# Patient Record
Sex: MALE | Race: WHITE | Employment: OTHER | URBAN - METROPOLITAN AREA
[De-identification: names, ages, dates, MRNs, and addresses within clinical notes are randomized per-mention and may not be internally consistent; named-entity substitution may affect disease eponyms.]

---

## 2017-03-13 ENCOUNTER — HOSPITAL ENCOUNTER (OUTPATIENT)
Facility: CLINIC | Age: 77
Setting detail: SPECIMEN
Discharge: HOME | End: 2017-03-13
Attending: INTERNAL MEDICINE | Admitting: INTERNAL MEDICINE
Payer: MEDICARE

## 2017-03-13 ENCOUNTER — ONCOLOGY VISIT (OUTPATIENT)
Dept: ONCOLOGY | Facility: CLINIC | Age: 77
End: 2017-03-13
Attending: INTERNAL MEDICINE
Payer: MEDICARE

## 2017-03-13 ENCOUNTER — HOSPITAL ENCOUNTER (OUTPATIENT)
Dept: CT IMAGING | Facility: CLINIC | Age: 77
Discharge: HOME | End: 2017-03-13
Attending: INTERNAL MEDICINE | Admitting: INTERNAL MEDICINE
Payer: MEDICARE

## 2017-03-13 DIAGNOSIS — C67.8 MALIGNANT NEOPLASM OF OVERLAPPING SITES OF BLADDER (H): ICD-10-CM

## 2017-03-13 LAB
ALBUMIN SERPL-MCNC: 3.5 G/DL (ref 3.4–5)
ALP SERPL-CCNC: 57 U/L (ref 40–150)
ALT SERPL W P-5'-P-CCNC: 41 U/L (ref 0–70)
ANION GAP SERPL CALCULATED.3IONS-SCNC: 9 MMOL/L (ref 3–14)
AST SERPL W P-5'-P-CCNC: 27 U/L (ref 0–45)
BASOPHILS # BLD AUTO: 0 10E9/L (ref 0–0.2)
BASOPHILS NFR BLD AUTO: 0.5 %
BILIRUB SERPL-MCNC: 0.9 MG/DL (ref 0.2–1.3)
BUN SERPL-MCNC: 38 MG/DL (ref 7–30)
CALCIUM SERPL-MCNC: 9.5 MG/DL (ref 8.5–10.1)
CHLORIDE SERPL-SCNC: 108 MMOL/L (ref 94–109)
CO2 SERPL-SCNC: 21 MMOL/L (ref 20–32)
CREAT SERPL-MCNC: 1.76 MG/DL (ref 0.66–1.25)
DIFFERENTIAL METHOD BLD: NORMAL
EOSINOPHIL # BLD AUTO: 0.1 10E9/L (ref 0–0.7)
EOSINOPHIL NFR BLD AUTO: 1.2 %
ERYTHROCYTE [DISTWIDTH] IN BLOOD BY AUTOMATED COUNT: 13.2 % (ref 10–15)
GFR SERPL CREATININE-BSD FRML MDRD: 38 ML/MIN/1.7M2
GLUCOSE SERPL-MCNC: 252 MG/DL (ref 70–99)
HCT VFR BLD AUTO: 42.9 % (ref 40–53)
HGB BLD-MCNC: 14.4 G/DL (ref 13.3–17.7)
IMM GRANULOCYTES # BLD: 0.1 10E9/L (ref 0–0.4)
IMM GRANULOCYTES NFR BLD: 0.8 %
LDH SERPL L TO P-CCNC: 188 U/L (ref 85–227)
LYMPHOCYTES # BLD AUTO: 1.2 10E9/L (ref 0.8–5.3)
LYMPHOCYTES NFR BLD AUTO: 17 %
MCH RBC QN AUTO: 31.8 PG (ref 26.5–33)
MCHC RBC AUTO-ENTMCNC: 33.6 G/DL (ref 31.5–36.5)
MCV RBC AUTO: 95 FL (ref 78–100)
MONOCYTES # BLD AUTO: 0.6 10E9/L (ref 0–1.3)
MONOCYTES NFR BLD AUTO: 8.5 %
NEUTROPHILS # BLD AUTO: 5.3 10E9/L (ref 1.6–8.3)
NEUTROPHILS NFR BLD AUTO: 72 %
NRBC # BLD AUTO: 0 10*3/UL
NRBC BLD AUTO-RTO: 0 /100
PLATELET # BLD AUTO: 162 10E9/L (ref 150–450)
POTASSIUM SERPL-SCNC: 4.6 MMOL/L (ref 3.4–5.3)
PROT SERPL-MCNC: 7 G/DL (ref 6.8–8.8)
RBC # BLD AUTO: 4.53 10E12/L (ref 4.4–5.9)
SODIUM SERPL-SCNC: 138 MMOL/L (ref 133–144)
WBC # BLD AUTO: 7.3 10E9/L (ref 4–11)

## 2017-03-13 PROCEDURE — 74176 CT ABD & PELVIS W/O CONTRAST: CPT

## 2017-03-13 PROCEDURE — 36415 COLL VENOUS BLD VENIPUNCTURE: CPT

## 2017-03-13 NOTE — NURSING NOTE
Medical Assistant Note:  Baldo Landon presents today for lab only.    Patient seen by provider today: No.   present during visit today: Not Applicable.    Concerns: No Concerns.    Procedure:  Not Applicable.      Post Assessment:  Labs drawn without difficulty: Yes.    Discharge Plan:  Departure Mode: Ambulatory.    Steph Green

## 2017-03-13 NOTE — MR AVS SNAPSHOT
After Visit Summary   3/13/2017    Baldo Landon    MRN: [id number]           Patient Information     Date Of Birth          1940        Visit Information        Provider Department      3/13/2017 11:00 AM RH ONCOLOGY NURSE [facility]        Today's Diagnoses     Malignant neoplasm of overlapping sites of bladder (H)           Follow-ups after your visit        Your next 10 appointments already scheduled     Mar 13, 2017 11:00 AM CDT   Return Visit with  ONCOLOGY NURSE   [facility] ([facility])    [facility]  [address]  [facility] [address]   [phone]            Mar 13, 2017 11:30 AM CDT   CT CHEST ABDOMEN PELVIS W/O CONTRAST with [address] ([facility])    [address]  [facility] [address]   [phone]           Please bring any scans or X-rays taken at other hospitals, if similar tests were done. Also bring a list of your medicines, including vitamins, minerals and over-the-counter drugs. It is safest to leave personal items at home.  Be sure to tell your doctor:   If you have any allergies.   If there s any chance you are pregnant.   If you are breastfeeding.   If you have any special needs.  You do not need to do anything special to prepare.  Please wear loose clothing, such as a sweat suit or jogging clothes. Avoid snaps, zippers and other metal. We may ask you to undress and put on a hospital gown.            Mar 21, 2017  1:15 PM CDT   Return Visit with Bradford Pires MD   [facility] ([facility])    [facility]  [address]  [facility] [address]   [phone]              Who to contact     If you have questions or need follow up information about today's clinic visit or your schedule please contact [facility] directly at [phone].  Normal or non-critical lab and imaging results will be communicated to you by MyChart, letter or phone within 4 business days after the clinic has received the results. If you do not hear from us within 7 days, please contact the clinic through [employer] or phone. If you have a critical or abnormal lab result, we will notify you by phone as soon as possible.  Submit refill requests through [employer] or call your pharmacy and they will forward the refill request to us. Please allow 3 business days for your refill to be completed.          Additional Information About Your Visit        [employer] Information     [employer] gives you secure access to your electronic health record. If you see a primary care provider, you can also send messages to your care team and make appointments. If you have questions, please call your primary care clinic.  If you do not have a primary care provider, please call [phone] and they will assist you.        Care EveryWhere ID     This is your Care EveryWhere ID. This could be used by other organizations to access your [place] medical records  [id number]         Blood Pressure from Last 3 Encounters:   09/16/16 105/68   09/16/16 105/68   06/17/16 112/75    Weight from Last 3 Encounters:   09/16/16 79.8 kg (176 lb)   09/16/16 80.2 kg (176 lb 12.8 oz)   06/17/16 77.1 kg (170 lb)              We Performed the Following     CBC with platelets differential     Comprehensive metabolic panel     Lactate Dehydrogenase        Primary Care Provider Office Phone # Fax #    Luis Monge [phone]       [address] [address]        Thank you!     Thank you for choosing [facility]  for your care. Our goal is always to provide you with excellent care. Hearing back from our patients is one way we can continue to improve our services. Please take a few minutes to complete the  written survey that you may receive in the mail after your visit with us. Thank you!             Your Updated Medication List - Protect others around you: Learn how to safely use, store and throw away your medicines at www.disposemymeds.org.          This list is accurate as of: 3/13/17 10:49 AM.  Always use your most recent med list.                   Brand Name Dispense Instructions for use    DAILY MULTIVITAMIN PO      Take 1 tablet by mouth daily       DIGOXIN PO      Take 125 mcg by mouth daily       GENTLE STOOL SOFTENER PO      Take 100 mg by mouth daily       GLIPIZIDE PO      Take 2.5 mg by mouth every morning (before breakfast)       LISINOPRIL PO      Take 5 mg by mouth       metoprolol 100 MG tablet    LOPRESSOR    60 tablet    Take 1 tablet (100 mg) by mouth 2 times daily       order for DME     1 Month    Equipment being ordered: Urostomy supplies       SIMVASTATIN PO      Take 40 mg by mouth At Bedtime       warfarin 5 MG tablet    COUMADIN    30 tablet    Take 1 tablet (5 mg) by mouth daily Except 1/2 pill on Monday

## 2017-03-20 ENCOUNTER — CARE COORDINATION (OUTPATIENT)
Dept: ONCOLOGY | Facility: CLINIC | Age: 77
End: 2017-03-20

## 2017-03-20 NOTE — PROGRESS NOTES
Per Dr. Betancourt, no need for Urogram since patient had CT of C/A/P on 3/13.  Stefan Vásquez, RN, BSN, OCN  [facility]  Patient Care Coordinator

## 2017-03-21 ENCOUNTER — ONCOLOGY VISIT (OUTPATIENT)
Dept: ONCOLOGY | Facility: CLINIC | Age: 77
End: 2017-03-21
Attending: INTERNAL MEDICINE
Payer: COMMERCIAL

## 2017-03-21 VITALS
RESPIRATION RATE: 16 BRPM | SYSTOLIC BLOOD PRESSURE: 121 MMHG | OXYGEN SATURATION: 98 % | HEART RATE: 84 BPM | HEIGHT: 70 IN | BODY MASS INDEX: 24.91 KG/M2 | DIASTOLIC BLOOD PRESSURE: 76 MMHG | TEMPERATURE: 98 F | WEIGHT: 174 LBS

## 2017-03-21 DIAGNOSIS — C67.8 MALIGNANT NEOPLASM OF OVERLAPPING SITES OF BLADDER (H): Primary | ICD-10-CM

## 2017-03-21 PROCEDURE — 99213 OFFICE O/P EST LOW 20 MIN: CPT | Performed by: INTERNAL MEDICINE

## 2017-03-21 PROCEDURE — 99211 OFF/OP EST MAY X REQ PHY/QHP: CPT

## 2017-03-21 ASSESSMENT — PAIN SCALES - GENERAL: PAINLEVEL: NO PAIN (0)

## 2017-03-21 NOTE — PROGRESS NOTES
"[facility]  HEMATOLOGY ONCOLOGY     DIAGNOSES:     1.  Muscle invasive bladder cancer diagnosed 05/11/2015.    2.  History of prostate cancer.      TREATMENT:     1.  TURBT, 05/11/2015.    2.  Cycle 1 neoadjuvant dose dense MVAC, methotrexate, vinblastine, doxorubicin and cyclophosphamide 06/11/2015 with Neulasta support.  Cycle 2 06/24/2015. Cycle 3 7/8/15, Cycle 4 7/22/15.  3.  Cystoprostatectomy - 9/15/15  4. Atezolizumab for recurrent urothelial cancer     SUBJECTIVE:  Baldo Landon comes in for a followup today. He is feeling well.    He has recovered well from his neoadjuvant chemotherapy / surgery and has no new complains.  He had a scan done just prior to clinic visit and he is here to review findings.     REVIEW OF SYSTEMS:  A complete review of systems was performed and found to be negative other than pertinent positives mentioned in history of present illness.     Past medical, social histories reviewed.    Meds- Reviewed.     PHYSICAL EXAMINATION:   VITAL SIGNS:/76  Pulse 84  Temp 98  F (36.7  C) (Tympanic)  Resp 16  Ht 1.778 m (5' 10\")  Wt 78.9 kg (174 lb)  SpO2 98%  BMI 24.97 kg/m2  GENERAL: Sitting comfortably.   HEENT: Pupils are equal. Oropharynx has small area of mucositis.  NECK: No cervical or supraclavicular lymphadenopathy.   LUNGS: Clear bilaterally.   HEART: S1, S2, regular.   ABDOMEN: Soft, nontender, nondistended, no hepatosplenomegaly.   EXTREMITIES: Warm, well perfused.   NEUROLOGIC: Alert, awake.   SKIN: No rash.   LYMPHATICS: No edema.     DATA:   Recent Labs   Lab Test  03/13/17   1026  09/16/16   1045  06/07/16   0753  03/18/16   1205  11/10/15   0955   NA  138  135  138  139  139   POTASSIUM  4.6  5.0  5.0  4.9  5.1   CHLORIDE  108  105  108  111*  110*   CO2  21  24  22  21  22   ANIONGAP  9  6  8  7  7   BUN  38*  33*  34*  34*  39*   CR  1.76*  1.73*  1.61*  1.59*  1.43*   GLC  252*  250*  167*  164*  185*   CARSON  9.5  8.8  8.7  9.0  9.3     Recent " Labs   Lab Test  09/28/15   0829  09/27/15   1032  09/26/15   0556  09/25/15   0705  09/24/15   0705  09/23/15   0645  09/22/15   0630   MAG  1.8  2.0  1.7  1.9  1.8  1.7   --    PHOS  3.3   --    --   2.8  2.8  2.6  4.0     Recent Labs   Lab Test  03/13/17   1026  09/16/16   1045  06/07/16   0753  03/18/16   1205  11/10/15   0955   WBC  7.3  6.2  6.5  5.4  7.1   HGB  14.4  12.5*  12.7*  11.5*  11.2*   PLT  162  135*  131*  131*  180   MCV  95  95  95  96  91   NEUTROPHIL  72.0  68.9  72.7  72.8  71.6     Recent Labs   Lab Test  03/13/17   1026  09/16/16   1045  06/07/16   0753   BILITOTAL  0.9  0.6  0.7   ALKPHOS  57  55  62   ALT  41  27  32   AST  27  19  23   ALBUMIN  3.5  3.3*  3.4   LDH  188  172  180     Results for orders placed or performed during the hospital encounter of 03/13/17   CT Chest Abdomen Pelvis w/o Contrast    Narrative    CT CHEST, ABDOMEN AND PELVIS WITHOUT CONTRAST 3/13/2017 11:47 AM     HISTORY: Malignant neoplasm of overlapping sites of bladder.     COMPARISON: CT chest, abdomen and pelvis 9/16/2016.    TECHNIQUE: Axial images from the thoracic inlet to the symphysis are  performed with additional coronal reformatted images. No IV contrast  is utilized. Oral contrast is administered. Radiation dose for this  scan was reduced using automated exposure control, adjustment of the  mA and/or kV according to patient size, or iterative reconstruction  technique.    FINDINGS:     Chest: Calcified granuloma is noted near the medial aspect right major  fissure in the right middle lobe on series 3, image 40.  Additional  calcified granulomas are noted in the posterolateral left lower lobe  on image 38 and in the posterior left lower lobe on image 35. Lungs  are otherwise clear. Area of nodularity along the anteromedial right  upper lobe on image 29 is stable and likely subpleural scarring. No  pleural or pericardial fluid. The heart is normal in size. Esophagus  is unremarkable. Thyroid gland appears  normal in size. No enlarged  lymph nodes. Aorta is calcified without evidence of aneurysm.    Abdomen: There is fatty infiltration of the liver. No calcified  gallstones. Pneumobilia is noted likely due to prior sphincterotomy.  Air is also noted in the gallbladder. Peripherally calcified lesion is  noted in the region of the lowell hepatis measuring 2.3 x 1.7 cm,  previously 2.4 x 1.6 cm. 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 Large cyst lower pole right kidney is unchanged.  Nonobstructing stones left collecting system are evident. Patient is  status post urine ostomy. No hydronephrosis. No right renal calculi.  Abdominal aorta is calcified along with branch vessels, but there is  no evidence of aneurysm. The spleen, pancreas and adrenal glands are  within normal limits. No enlarged lymph nodes. The bowel is  unremarkable. No obstruction or diverticulitis.    Pelvis: Prior cystoprostatectomy. No enlarged pelvic lymph nodes or  free fluid. Rectum is unremarkable with mild circumferential wall  thickening probably due to incomplete distention. 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 Prominence of the external iliac arteries and  proximal left internal iliac artery are also noted. Bone window  examination demonstrates mild degenerative SI joint and spine changes.      Impression    IMPRESSION:  1. Evidence of old granulomatous disease. Lungs are otherwise clear.  No enlarged lymph nodes.  2. Fatty infiltration of the liver and pneumobilia appear unchanged.  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 No interval change.  3. Tiny nonobstructing left renal collecting system stones. No  hydronephrosis. Large simple-appearing cyst lower pole right kidney is  unchanged.  4. Postop changes from cystoprostatectomy and ileal loop diversion.  5.  Extensive calcified atherosclerotic plaque in the aorta without  evidence of aortic aneurysm. Dilatation of the common iliac arteries,  left external iliac artery and proximal left internal iliac artery are  consistent with aneurysms. No significant interval change.    JACQUELYN SHARMA MD         ASSESSMENT:    1. Muscle invasive bladder cancer - post dose dense MVAC ypT1N0 disease  2. CKD stage III; with some decline in function  3. Prostate cancer s/p xrt, TIA - 2010, A fib on warfarin, DM II    I have reviewed actual images from his recent staging CT scan. All his previous lung lesions are stable. Nothing to suggest recurrent disease based on his CT scans, labs or symptoms    His kidney function remains worse off today. He does have HTN and DM TII in addition to his CKD.     I explained that we could follow him every 4-6 months. I have suggested that he get his labs, scans and urine exam - week to 10 days prior to clinic visit if feasible. Urine cytology is negative and FISH suggests inadequate due to cell counts.

## 2017-03-21 NOTE — NURSING NOTE
"Baldo Landon is a 76 year old male who presents for:  Chief Complaint   Patient presents with     Oncology Clinic Visit     Bladder cancer - follow up        Initial Vitals:  /76  Pulse 84  Temp 98  F (36.7  C) (Tympanic)  Resp 16  Ht 1.778 m (5' 10\")  Wt 78.9 kg (174 lb)  SpO2 98%  BMI 24.97 kg/m2 Estimated body mass index is 24.97 kg/(m^2) as calculated from the following:    Height as of this encounter: 1.778 m (5' 10\").    Weight as of this encounter: 78.9 kg (174 lb).. Body surface area is 1.97 meters squared. BP completed using cuff size: regular  No Pain (0) No LMP for male patient. Allergies and medications reviewed.     Medications: Medication refills not needed today.  Pharmacy name entered into [employer]:    [place], [place] - [address] [facility] [address] [place], [place] - [address] [id number] - [place], [place] - [address]    Comments: Follow up    8 minutes for nursing intake (face to face time)   Steph Green CMA     DISCHARGE PLAN:  Next appointments: See patient instruction section  Departure Mode: Ambulatory  Accompanied by: wife  0 minutes for nursing discharge (face to face time)   Steph Green CMA            "

## 2017-03-21 NOTE — MR AVS SNAPSHOT
After Visit Summary   3/21/2017    Baldo Landon    MRN: [id number]           Patient Information     Date Of Birth          1940        Visit Information        Provider Department      3/21/2017 1:15 PM Bradford Pires MD [facility]        Today's Diagnoses     Malignant neoplasm of overlapping sites of bladder (H)    -  1      Care Instructions    1.  Proceed with chemotherapy N/A    2.  Therapy/Supportive Treatment NO       3.  Does the patient require further chemotherapy N/A    4.  Appointments:  Follow up with me in 6 months    5.  Labs:  CBC w Diff, CMP and LDH; urine for cytology and FISH a week to 10 days prior to visit     6.  Imaging:  CT Scan Chest/abd/pelvis 10 days prior to visit    7.  Other No          Follow-ups after your visit        Your next 10 appointments already scheduled     Mar 21, 2017  1:15 PM CDT   Return Visit with Bradford Pires MD   [facility] ([facility])    [facility]  [address]  [facility] [address]   [phone]            Mar 22, 2017  2:00 PM CDT   Return Visit with Ruddy Betancourt MD   [facility] ([facility])    [facility]  [address]  [facility] [address]   [phone]            Sep 12, 2017 11:00 AM CDT   Return Visit with  ONCOLOGY NURSE   [facility] ([facility])    [facility]  [address]  [facility] [address]   [phone]            Sep 12, 2017 11:30 AM CDT   CT CHEST ABDOMEN PELVIS W/O CONTRAST with [id number] ([facility])    [address]  [facility] [address]   [phone]           Please bring any scans or X-rays taken at other hospitals, if similar tests were done. Also bring a list of  your medicines, including vitamins, minerals and over-the-counter drugs. It is safest to leave personal items at home.  Be sure to tell your doctor:   If you have any allergies.   If there s any chance you are pregnant.   If you are breastfeeding.   If you have any special needs.  You do not need to do anything special to prepare.  Please wear loose clothing, such as a sweat suit or jogging clothes. Avoid snaps, zippers and other metal. We may ask you to undress and put on a hospital gown.            Sep 19, 2017 12:15 PM CDT   Return Visit with Bradford Pires MD   [facility] ([facility])    [facility]  [address]  [facility] [address] [phone]              Future tests that were ordered for you today     Open Standing Orders        Priority Remaining Interval Expires Ordered    CBC with platelets differential Routine 12/12  3/21/2018 3/21/2017    Comprehensive metabolic panel Routine 12/12  3/21/2018 3/21/2017    Lactate Dehydrogenase Routine 12/12  3/21/2018 3/21/2017    Cytology non gyn Routine 12/12  3/21/2018 3/21/2017          Open Future Orders        Priority Expected Expires Ordered    CT Chest Abdomen Pelvis w/o Contrast Routine 9/5/2017 3/21/2018 3/21/2017            Who to contact     If you have questions or need follow up information about today's clinic visit or your schedule please contact [facility] directly at [phone].  Normal or non-critical lab and imaging results will be communicated to you by MyChart, letter or phone within 4 business days after the clinic has received the results. If you do not hear from us within 7 days, please contact the clinic through [employer] or phone. If you have a critical or abnormal lab result, we will notify you by phone as soon as possible.  Submit refill requests through [employer] or call your pharmacy and they will forward the refill request to us.  "Please allow 3 business days for your refill to be completed.          Additional Information About Your Visit        [employer] Information     [employer] gives you secure access to your electronic health record. If you see a primary care provider, you can also send messages to your care team and make appointments. If you have questions, please call your primary care clinic.  If you do not have a primary care provider, please call [phone] and they will assist you.        Care EveryWhere ID     This is your Care EveryWhere ID. This could be used by other organizations to access your [place] medical records  [id number]        Your Vitals Were     Pulse Temperature Respirations Height Pulse Oximetry BMI (Body Mass Index)    84 98  F (36.7  C) (Tympanic) 16 1.778 m (5' 10\") 98% 24.97 kg/m2       Blood Pressure from Last 3 Encounters:   03/21/17 121/76   09/16/16 105/68   09/16/16 105/68    Weight from Last 3 Encounters:   03/21/17 78.9 kg (174 lb)   09/16/16 79.8 kg (176 lb)   09/16/16 80.2 kg (176 lb 12.8 oz)                 Today's Medication Changes          These changes are accurate as of: 3/21/17  1:07 PM.  If you have any questions, ask your nurse or doctor.               Stop taking these medicines if you haven't already. Please contact your care team if you have questions.     SIMVASTATIN PO                    Primary Care Provider Office Phone # Fax #    Luis Monge [phone]       [address] [address]        Thank you!     Thank you for choosing [facility]  for your care. Our goal is always to provide you with excellent care. Hearing back from our patients is one way we can continue to improve our services. Please take a few minutes to complete the written survey that you may receive in the mail after your visit with us. Thank you!             Your Updated Medication List - Protect others around you: Learn how to safely " use, store and throw away your medicines at www.disposemymeds.org.          This list is accurate as of: 3/21/17  1:07 PM.  Always use your most recent med list.                   Brand Name Dispense Instructions for use    DAILY MULTIVITAMIN PO      Take 1 tablet by mouth daily       DIGOXIN PO      Take 125 mcg by mouth daily 1/2 tablet once daily       GENTLE STOOL SOFTENER PO      Take 100 mg by mouth daily       GLIPIZIDE PO      Take 10 mg by mouth 2 times daily (before meals)       LIPITOR PO      Take 80 mg by mouth daily 1/2 tablet daily       LISINOPRIL PO      Take 5 mg by mouth       metoprolol 100 MG tablet    LOPRESSOR    60 tablet    Take 1 tablet (100 mg) by mouth 2 times daily       order for DME     1 Month    Equipment being ordered: Urostomy supplies       warfarin 5 MG tablet    COUMADIN    30 tablet    Take 1 tablet (5 mg) by mouth daily Except 1/2 pill on Monday

## 2017-03-21 NOTE — PATIENT INSTRUCTIONS
1.  Proceed with chemotherapy N/A    2.  Therapy/Supportive Treatment NO       3.  Does the patient require further chemotherapy N/A    4.  Appointments:  Follow up with me in 6 months- Scheduled for Sept 19th @ 12:15/Elodia    5.  Labs:  CBC w Diff, CMP and LDH; urine for cytology and FISH a week to 10 days prior to visit - Scheduled for Sept 12th @ 11:00    6.  Imaging:  CT Scan Chest/abd/pelvis 10 days prior to visit- Scheduled for Sept 12th @ 11:30/Elodia    7.  Other No     AVS printed and given.  Elodia

## 2017-03-22 ENCOUNTER — ONCOLOGY VISIT (OUTPATIENT)
Dept: ONCOLOGY | Facility: CLINIC | Age: 77
End: 2017-03-22
Attending: UROLOGY
Payer: COMMERCIAL

## 2017-03-22 VITALS
RESPIRATION RATE: 16 BRPM | HEIGHT: 70 IN | TEMPERATURE: 98 F | HEART RATE: 92 BPM | BODY MASS INDEX: 25.05 KG/M2 | DIASTOLIC BLOOD PRESSURE: 80 MMHG | WEIGHT: 175 LBS | OXYGEN SATURATION: 98 % | SYSTOLIC BLOOD PRESSURE: 128 MMHG

## 2017-03-22 DIAGNOSIS — C67.8 MALIGNANT NEOPLASM OF OVERLAPPING SITES OF BLADDER (H): Primary | ICD-10-CM

## 2017-03-22 PROCEDURE — 99213 OFFICE O/P EST LOW 20 MIN: CPT | Performed by: UROLOGY

## 2017-03-22 PROCEDURE — 99211 OFF/OP EST MAY X REQ PHY/QHP: CPT

## 2017-03-22 ASSESSMENT — PAIN SCALES - GENERAL: PAINLEVEL: NO PAIN (0)

## 2017-03-22 NOTE — MR AVS SNAPSHOT
After Visit Summary   3/22/2017    Baldo Landon    MRN: [id number]           Patient Information     Date Of Birth          1940        Visit Information        Provider Department      3/22/2017 2:00 PM Weight, Ruddy Casarez MD [facility] On[facility]      Today's Diagnoses     Malignant neoplasm of overlapping sites of bladder (H)    -  1      Care Instructions    We will follow up in [place] in 6 months with his visit with Dr. Pires:    Imaging per Dr. Pires    Labs per Dr. Pires    Doing well    Parastomal hernia        Follow-ups after your visit        Your next 10 appointments already scheduled     Sep 12, 2017 11:00 AM CDT   Return Visit with  ONCOLOGY NURSE   [facility] ([facility])    [facility]  [address]  [facility] [address] [phone]            Sep 12, 2017 11:30 AM CDT   CT CHEST ABDOMEN PELVIS W/O CONTRAST with [address] ([facility])    [address]  [facility] [address] [phone]           Please bring any scans or X-rays taken at other hospitals, if similar tests were done. Also bring a list of your medicines, including vitamins, minerals and over-the-counter drugs. It is safest to leave personal items at home.  Be sure to tell your doctor:   If you have any allergies.   If there s any chance you are pregnant.   If you are breastfeeding.   If you have any special needs.  You do not need to do anything special to prepare.  Please wear loose clothing, such as a sweat suit or jogging clothes. Avoid snaps, zippers and other metal. We may ask you to undress and put on a hospital gown.            Sep 19, 2017 12:15 PM CDT   Return Visit with Bradford Pires MD   [facility] ([facility])    [facility]  [address]  [facility] [address]   [phone]              Future tests that were ordered for you today     Open Standing Orders        Priority Remaining Interval Expires Ordered    CBC with platelets differential Routine 12/12  3/21/2018 3/21/2017    Comprehensive metabolic panel Routine 12/12  3/21/2018 3/21/2017    Lactate Dehydrogenase Routine 12/12  3/21/2018 3/21/2017    Cytology non gyn Routine 12/12  3/21/2018 3/21/2017          Open Future Orders        Priority Expected Expires Ordered    CT Chest Abdomen Pelvis w/o Contrast Routine 9/5/2017 3/21/2018 3/21/2017            Who to contact     If you have questions or need follow up information about today's clinic visit or your schedule please contact [facility] directly at [phone].  Normal or non-critical lab and imaging results will be communicated to you by Uplikehart, letter or phone within 4 business days after the clinic has received the results. If you do not hear from us within 7 days, please contact the clinic through Salezeot or phone. If you have a critical or abnormal lab result, we will notify you by phone as soon as possible.  Submit refill requests through Structure Vision or call your pharmacy and they will forward the refill request to us. Please allow 3 business days for your refill to be completed.          Additional Information About Your Visit        Structure Vision Information     Structure Vision gives you secure access to your electronic health record. If you see a primary care provider, you can also send messages to your care team and make appointments. If you have questions, please call your primary care clinic.  If you do not have a primary care provider, please call [phone] and they will assist you.        Care EveryWhere ID     This is your Care EveryWhere ID. This could be used by other organizations to access your [place] medical records  [id number]        Your Vitals Were     Pulse Temperature Respirations Height  "Pulse Oximetry BMI (Body Mass Index)    92 98  F (36.7  C) (Tympanic) 16 1.778 m (5' 10\") 98% 25.11 kg/m2       Blood Pressure from Last 3 Encounters:   03/22/17 128/80   03/21/17 121/76   09/16/16 105/68    Weight from Last 3 Encounters:   03/22/17 79.4 kg (175 lb)   03/21/17 78.9 kg (174 lb)   09/16/16 79.8 kg (176 lb)              Today, you had the following     No orders found for display       Primary Care Provider Office Phone # Fax #    Luis Monge [phone]       [address] [address]        Thank you!     Thank you for choosing [facility]  for your care. Our goal is always to provide you with excellent care. Hearing back from our patients is one way we can continue to improve our services. Please take a few minutes to complete the written survey that you may receive in the mail after your visit with us. Thank you!             Your Updated Medication List - Protect others around you: Learn how to safely use, store and throw away your medicines at www.disposemymeds.org.          This list is accurate as of: 3/22/17  2:47 PM.  Always use your most recent med list.                   Brand Name Dispense Instructions for use    DAILY MULTIVITAMIN PO      Take 1 tablet by mouth daily       DIGOXIN PO      Take 125 mcg by mouth daily 1/2 tablet once daily       GENTLE STOOL SOFTENER PO      Take 100 mg by mouth daily       GLIPIZIDE PO      Take 10 mg by mouth 2 times daily (before meals)       LIPITOR PO      Take 80 mg by mouth daily 1/2 tablet daily       LISINOPRIL PO      Take 5 mg by mouth       metoprolol 100 MG tablet    LOPRESSOR    60 tablet    Take 1 tablet (100 mg) by mouth 2 times daily       order for DME     1 Month    Equipment being ordered: Urostomy supplies       warfarin 5 MG tablet    COUMADIN    30 tablet    Take 5 mg by mouth daily Except 1/2 pill on Monday         "

## 2017-03-22 NOTE — NURSING NOTE
"Baldo Landon is a 76 year old male who presents for:  Chief Complaint   Patient presents with     Oncology Clinic Visit     Malignant neoplasm of overlapping sites of bladder -Follow up        Initial Vitals:  /80 (BP Location: Right arm, Patient Position: Chair, Cuff Size: Adult Regular)  Pulse 92  Temp 98  F (36.7  C) (Tympanic)  Resp 16  Ht 1.778 m (5' 10\")  Wt 79.4 kg (175 lb)  SpO2 98%  BMI 25.11 kg/m2 Estimated body mass index is 25.11 kg/(m^2) as calculated from the following:    Height as of this encounter: 1.778 m (5' 10\").    Weight as of this encounter: 79.4 kg (175 lb).. Body surface area is 1.98 meters squared. BP completed using cuff size: regular  No Pain (0) No LMP for male patient. Allergies and medications reviewed.     Medications: Medication refills not needed today.  Pharmacy name entered into FamilyID:    [place], [place] - [address] [facility] [address] [place] [address] - [address] [facility]ARMACY [address][id number] [place], [place] - [address]    Comments: Follow-up  8 minutes for nursing intake (face to face time)   Lady Barnes  DISCHARGE PLAN:  Next appointments: See patient instruction section  Departure Mode: Ambulatory  Accompanied by: wifeYohana  0 minutes for nursing discharge (face to face time)   Lady Barnes          "

## 2017-03-22 NOTE — PATIENT INSTRUCTIONS
We will follow up in [place] in 6 months with his visit with Dr. Pires:    Imaging per Dr. Pires    Labs per Dr. Pires    Doing well    Parastomal hernia

## 2017-03-22 NOTE — PROGRESS NOTES
"Mr Landon is a pleasant 76 year old man here with history of a cystoprostatectomy performed on 9/15/2015 with a post-op course complicated with a-fib, prolonged ileus.  However doing really well now.      Neoadjuvant chemotherapy MVAC  TMN Stage: [id number], Original stage was T2  Number of nodes removed: 17  Number of positive nodes: 0  Surgical Margins : negative  Grade: high  Histology: urothelial without secondary histology  Surgery: Radical Cx and Ileal conduit 9/15    No problems with ostomy device.  No blood in urine.  No flank pain.      PE  /80 (BP Location: Right arm, Patient Position: Chair, Cuff Size: Adult Regular)  Pulse 92  Temp 98  F (36.7  C) (Tympanic)  Resp 16  Ht 1.778 m (5' 10\")  Wt 79.4 kg (175 lb)  SpO2 98%  BMI 25.11 kg/m2    Incision sites are healed well.  Slight bulge around stoma, but no hernia on imaging    CT shows no evidence of disease and no hydro to explain slight Cr rise. Large kidney cyst    Lab Results   Component Value Date    CR 1.76 03/13/2017    CR 1.73 09/16/2016    CR 1.61 06/07/2016    CR 1.59 03/18/2016    CR 1.43 11/10/2015    CR 1.12 09/28/2015    CR 1.09 09/26/2015    CR 1.07 09/25/2015    CR 1.08 09/24/2015    CR 1.25 09/23/2015             Assessment history of pT2(T1 after MVAC)[id number] urothelial cancer with no evidence of disease s/p Rad Cx 9/15  Plan:    We will follow up in [place] in 6 months with his visit with Dr. Pires:    Imaging per Dr. Pires    Labs per Dr. Pires    Doing well    Parastomal hernia    Going to see nephrology    Ruddy Betancourt MD  Department of Urology Staff  [facility]        "

## 2017-09-12 ENCOUNTER — HOSPITAL ENCOUNTER (OUTPATIENT)
Dept: CT IMAGING | Facility: CLINIC | Age: 77
Discharge: HOME | End: 2017-09-12
Attending: INTERNAL MEDICINE | Admitting: INTERNAL MEDICINE
Payer: MEDICARE

## 2017-09-12 ENCOUNTER — HOSPITAL ENCOUNTER (OUTPATIENT)
Facility: CLINIC | Age: 77
Setting detail: SPECIMEN
Discharge: HOME | End: 2017-09-12
Attending: INTERNAL MEDICINE | Admitting: INTERNAL MEDICINE
Payer: MEDICARE

## 2017-09-12 ENCOUNTER — ONCOLOGY VISIT (OUTPATIENT)
Dept: ONCOLOGY | Facility: CLINIC | Age: 77
End: 2017-09-12
Attending: INTERNAL MEDICINE
Payer: MEDICARE

## 2017-09-12 DIAGNOSIS — C67.8 MALIGNANT NEOPLASM OF OVERLAPPING SITES OF BLADDER (H): ICD-10-CM

## 2017-09-12 LAB
ALBUMIN SERPL-MCNC: 3.5 G/DL (ref 3.4–5)
ALP SERPL-CCNC: 76 U/L (ref 40–150)
ALT SERPL W P-5'-P-CCNC: 28 U/L (ref 0–70)
ANION GAP SERPL CALCULATED.3IONS-SCNC: 7 MMOL/L (ref 3–14)
AST SERPL W P-5'-P-CCNC: 14 U/L (ref 0–45)
BASOPHILS # BLD AUTO: 0 10E9/L (ref 0–0.2)
BASOPHILS NFR BLD AUTO: 0.4 %
BILIRUB SERPL-MCNC: 1.1 MG/DL (ref 0.2–1.3)
BUN SERPL-MCNC: 39 MG/DL (ref 7–30)
CALCIUM SERPL-MCNC: 9.7 MG/DL (ref 8.5–10.1)
CHLORIDE SERPL-SCNC: 107 MMOL/L (ref 94–109)
CO2 SERPL-SCNC: 23 MMOL/L (ref 20–32)
CREAT SERPL-MCNC: 1.79 MG/DL (ref 0.66–1.25)
DIFFERENTIAL METHOD BLD: ABNORMAL
EOSINOPHIL # BLD AUTO: 0.1 10E9/L (ref 0–0.7)
EOSINOPHIL NFR BLD AUTO: 1.1 %
ERYTHROCYTE [DISTWIDTH] IN BLOOD BY AUTOMATED COUNT: 13.5 % (ref 10–15)
GFR SERPL CREATININE-BSD FRML MDRD: 37 ML/MIN/1.7M2
GLUCOSE SERPL-MCNC: 235 MG/DL (ref 70–99)
HCT VFR BLD AUTO: 40.3 % (ref 40–53)
HGB BLD-MCNC: 13.4 G/DL (ref 13.3–17.7)
IMM GRANULOCYTES # BLD: 0.1 10E9/L (ref 0–0.4)
IMM GRANULOCYTES NFR BLD: 1 %
LDH SERPL L TO P-CCNC: 183 U/L (ref 85–227)
LYMPHOCYTES # BLD AUTO: 1.1 10E9/L (ref 0.8–5.3)
LYMPHOCYTES NFR BLD AUTO: 13.2 %
MCH RBC QN AUTO: 31 PG (ref 26.5–33)
MCHC RBC AUTO-ENTMCNC: 33.3 G/DL (ref 31.5–36.5)
MCV RBC AUTO: 93 FL (ref 78–100)
MONOCYTES # BLD AUTO: 0.5 10E9/L (ref 0–1.3)
MONOCYTES NFR BLD AUTO: 6.5 %
NEUTROPHILS # BLD AUTO: 6.4 10E9/L (ref 1.6–8.3)
NEUTROPHILS NFR BLD AUTO: 77.8 %
NRBC # BLD AUTO: 0 10*3/UL
NRBC BLD AUTO-RTO: 0 /100
PLATELET # BLD AUTO: 191 10E9/L (ref 150–450)
POTASSIUM SERPL-SCNC: 4.5 MMOL/L (ref 3.4–5.3)
PROT SERPL-MCNC: 7.4 G/DL (ref 6.8–8.8)
RBC # BLD AUTO: 4.32 10E12/L (ref 4.4–5.9)
SODIUM SERPL-SCNC: 137 MMOL/L (ref 133–144)
WBC # BLD AUTO: 8.2 10E9/L (ref 4–11)

## 2017-09-12 PROCEDURE — 88112 CYTOPATH CELL ENHANCE TECH: CPT | Performed by: INTERNAL MEDICINE

## 2017-09-12 PROCEDURE — 88112 CYTOPATH CELL ENHANCE TECH: CPT | Mod: 26 | Performed by: INTERNAL MEDICINE

## 2017-09-12 PROCEDURE — 36415 COLL VENOUS BLD VENIPUNCTURE: CPT

## 2017-09-12 PROCEDURE — 74176 CT ABD & PELVIS W/O CONTRAST: CPT

## 2017-09-12 NOTE — MR AVS SNAPSHOT
After Visit Summary   9/12/2017    Baldo Landon    MRN: [id number]           Patient Information     Date Of Birth          1940        Visit Information        Provider Department      9/12/2017 11:00 AM  ONCOLOGY NURSE [facility]        Today's Diagnoses     Malignant neoplasm of overlapping sites of bladder (H)           Follow-ups after your visit        Your next 10 appointments already scheduled     Sep 12, 2017 11:30 AM CDT   CT CHEST ABDOMEN PELVIS W/O CONTRAST with [id number] ([facility])    [address] [facility] [address]  [facility] [address] [phone]           Please bring any scans or X-rays taken at other hospitals, if similar tests were done. Also bring a list of your medicines, including vitamins, minerals and over-the-counter drugs. It is safest to leave personal items at home.  Be sure to tell your doctor:   If you have any allergies.   If there s any chance you are pregnant.   If you are breastfeeding.   If you have any special needs.  You may have contrast for this exam. To prepare:   Do not eat or drink for 2 hours before your exam. If you need to take medicine, you may take it with small sips of water. (We may ask you to take liquid medicine as well.)   The day before your exam, drink extra fluids at least six 8-ounce glasses (unless your doctor tells you to restrict your fluids).  Patients over 70 or patients with diabetes or kidney problems:   If you haven t had a blood test (creatinine test) within the last 30 days, go to your clinic or Diagnostic Imaging Department for this test.  If you have diabetes:   If your kidney function is normal, continue taking your metformin (Avandamet, Glucophage, Glucovance, Metaglip) on the day of your exam.   If your kidney function is abnormal, wait 48 hours before restarting this medicine.  You will have oral contrast for this exam:   You will  drink the contrast at home. Get this from your clinic or Diagnostic Imaging Department. Please follow the directions given.  Please wear loose clothing, such as a sweat suit or jogging clothes. Avoid snaps, zippers and other metal. We may ask you to undress and put on a hospital gown.  If you have any questions, please call the Imaging Department where you will have your exam.            Sep 19, 2017 12:15 PM CDT   Return Visit with Bradford Pires MD   [facility] ([facility])    [facility]  [address]  [facility] [id number] [phone]              Who to contact     If you have questions or need follow up information about today's clinic visit or your schedule please contact [facility] directly at [phone].  Normal or non-critical lab and imaging results will be communicated to you by MyChart, letter or phone within 4 business days after the clinic has received the results. If you do not hear from us within 7 days, please contact the clinic through MetaIntellhart or phone. If you have a critical or abnormal lab result, we will notify you by phone as soon as possible.  Submit refill requests through Lily & Strum or call your pharmacy and they will forward the refill request to us. Please allow 3 business days for your refill to be completed.          Additional Information About Your Visit        MyChart Information     Lily & Strum gives you secure access to your electronic health record. If you see a primary care provider, you can also send messages to your care team and make appointments. If you have questions, please call your primary care clinic.  If you do not have a primary care provider, please call [phone] and they will assist you.        Care EveryWhere ID     This is your Care EveryWhere ID. This could be used by other organizations to access your [place] medical records  [id number]         Blood  Pressure from Last 3 Encounters:   03/22/17 128/80   03/21/17 121/76   09/16/16 105/68    Weight from Last 3 Encounters:   03/22/17 79.4 kg (175 lb)   03/21/17 78.9 kg (174 lb)   09/16/16 79.8 kg (176 lb)              We Performed the Following     CBC with platelets differential     Comprehensive metabolic panel     Cytology non gyn     Lactate Dehydrogenase        Primary Care Provider Office Phone # Fax #    Luis Monge [phone]       [facility] [address] [facility] [address]        Equal Access to Services     NIKKI [facility] : Hadii nathanael kim Sojhonathan, wayvon booth, tenzin kaalmashasha shi, didier lang . So [facility] [phone].    ATENCIÓN: Si habla español, tiene a dudley disposición servicios gratuitos de asistencia lingüística. [facility] [phone].    We comply with applicable federal civil rights laws and [place] laws. We do not discriminate on the basis of race, color, national origin, age, disability sex, sexual orientation or gender identity.            Thank you!     Thank you for choosing [facility]  for your care. Our goal is always to provide you with excellent care. Hearing back from our patients is one way we can continue to improve our services. Please take a few minutes to complete the written survey that you may receive in the mail after your visit with us. Thank you!             Your Updated Medication List - Protect others around you: Learn how to safely use, store and throw away your medicines at www.disposemymeds.org.          This list is accurate as of: 9/12/17 11:09 AM.  Always use your most recent med list.                   Brand Name Dispense Instructions for use Diagnosis    DAILY MULTIVITAMIN PO      Take 1 tablet by mouth daily        DIGOXIN PO      Take 125 mcg by mouth daily 1/2 tablet once daily        GENTLE STOOL SOFTENER PO      Take 100 mg by mouth daily    Malignant neoplasm  of overlapping sites of bladder (H)       GLIPIZIDE PO      Take 10 mg by mouth 2 times daily (before meals)        LIPITOR PO      Take 80 mg by mouth daily 1/2 tablet daily    Malignant neoplasm of overlapping sites of bladder (H)       LISINOPRIL PO      Take 5 mg by mouth    Malignant neoplasm of overlapping sites of bladder (H), Pulmonary nodules       metoprolol 100 MG tablet    LOPRESSOR    60 tablet    Take 1 tablet (100 mg) by mouth 2 times daily    Chronic atrial fibrillation (H)       order for DME     1 Month    Equipment being ordered: Urostomy supplies    Bladder cancer (H)       warfarin 5 MG tablet    COUMADIN    30 tablet    Take 5 mg by mouth daily Except 1/2 pill on Monday    Chronic atrial fibrillation (H)

## 2017-09-12 NOTE — NURSING NOTE
Medical Assistant Note:  Baldo Landon presents today for Blood Draw.    Patient seen by provider today: No.   present during visit today: Not Applicable.    Concerns: No Concerns.    Procedure:  Lab draw site: Yes, urine was also obtain today for urine cytology and fish.      Post Assessment:  Labs drawn without difficulty: Yes.    Discharge Plan:  Patient discharged in stable condition accompanied by: self.    Face to Face Time: 10 Min.    Lady Barnes MA

## 2017-09-18 LAB
COPATH REPORT: NORMAL
COPATH REPORT: NORMAL

## 2017-09-19 ENCOUNTER — ONCOLOGY VISIT (OUTPATIENT)
Dept: ONCOLOGY | Facility: CLINIC | Age: 77
End: 2017-09-19
Attending: INTERNAL MEDICINE
Payer: COMMERCIAL

## 2017-09-19 VITALS
OXYGEN SATURATION: 97 % | BODY MASS INDEX: 24.97 KG/M2 | TEMPERATURE: 97.2 F | HEART RATE: 67 BPM | HEIGHT: 70 IN | RESPIRATION RATE: 16 BRPM | WEIGHT: 174.4 LBS | SYSTOLIC BLOOD PRESSURE: 116 MMHG | DIASTOLIC BLOOD PRESSURE: 72 MMHG

## 2017-09-19 DIAGNOSIS — C67.8 MALIGNANT NEOPLASM OF OVERLAPPING SITES OF BLADDER (H): Primary | ICD-10-CM

## 2017-09-19 PROCEDURE — 99211 OFF/OP EST MAY X REQ PHY/QHP: CPT

## 2017-09-19 PROCEDURE — 99214 OFFICE O/P EST MOD 30 MIN: CPT | Performed by: INTERNAL MEDICINE

## 2017-09-19 RX ORDER — SAXAGLIPTIN 2.5 MG/1
TABLET, FILM COATED ORAL DAILY
COMMUNITY

## 2017-09-19 RX ORDER — LOSARTAN POTASSIUM 25 MG/1
25 TABLET ORAL DAILY
COMMUNITY

## 2017-09-19 ASSESSMENT — PAIN SCALES - GENERAL: PAINLEVEL: NO PAIN (0)

## 2017-09-19 NOTE — NURSING NOTE
"Oncology Rooming Note    September 19, 2017 12:21 PM   Baldo Landon is a 76 year old male who presents for:    Chief Complaint   Patient presents with     Oncology Clinic Visit     Bladder Cancer-Follow up     Initial Vitals: /72  Pulse 67  Temp 97.2  F (36.2  C) (Tympanic)  Resp 16  Ht 1.778 m (5' 10\")  Wt 79.1 kg (174 lb 6.4 oz)  SpO2 97%  BMI 25.02 kg/m2 Estimated body mass index is 25.02 kg/(m^2) as calculated from the following:    Height as of this encounter: 1.778 m (5' 10\").    Weight as of this encounter: 79.1 kg (174 lb 6.4 oz). Body surface area is 1.98 meters squared.  No Pain (0) Comment: Data Unavailable   No LMP for male patient.  Allergies reviewed: Yes  Medications reviewed: Yes    Medications: Medication refills not needed today.  Pharmacy name entered into Jiglu:    [place], [place] - [address] [facility] [address] [place] [address] [address] [facility]Y [facility]30[address] [place], [place] - [address]    Clinical concerns: Follow up to labs and scans    8 minutes for nursing intake (face to face time)     Mariela Gomez CMA     DISCHARGE PLAN:  Next appointments: See patient instruction section  Departure Mode: Ambulatory  Accompanied by: wife  0 minutes for nursing discharge (face to face time)   Mariela Gomez CMA                  "

## 2017-09-19 NOTE — PROGRESS NOTES
"[facility] PHYSICIANS  HEMATOLOGY ONCOLOGY     DIAGNOSES:     1.  Muscle invasive bladder cancer diagnosed 05/11/2015.    2.  History of prostate cancer.      TREATMENT:     1.  TURBT, 05/11/2015.    2.  Cycle 1 neoadjuvant dose dense MVAC, methotrexate, vinblastine, doxorubicin and cyclophosphamide 06/11/2015 with Neulasta support.  Cycle 2 06/24/2015. Cycle 3 7/8/15, Cycle 4 7/22/15.  3.  Cystoprostatectomy - 9/15/15    SUBJECTIVE:  Baldo Landon comes in for a followup today. He is feeling well.    Since the last visit he had a colonoscopy at the [facility]. It was found that he had a polyp and a cyst during the colonoscopy. I do not have a fair idea about the location of this cyst but it needed a separate follow up procedure. He had a difficult recovery period as it was very painful and he had to carry a donut seat along with him and had a very difficult time sitting.     He had a scan done just prior to clinic visit and he is here to review findings.     REVIEW OF SYSTEMS:  A complete review of systems was performed and found to be negative other than pertinent positives mentioned in history of present illness.     Past medical, social histories reviewed.    Meds- Reviewed.     PHYSICAL EXAMINATION:   VITAL SIGNS:/72  Pulse 67  Temp 97.2  F (36.2  C) (Tympanic)  Resp 16  Ht 1.778 m (5' 10\")  Wt 79.1 kg (174 lb 6.4 oz)  SpO2 97%  BMI 25.02 kg/m2  GENERAL: Sitting comfortably.   HEENT: Pupils are equal. Oropharynx has small area of mucositis.  NECK: No cervical or supraclavicular lymphadenopathy.   LUNGS: Clear bilaterally.   HEART: S1, S2, regular.   ABDOMEN: Soft, nontender, nondistended, no hepatosplenomegaly.   EXTREMITIES: Warm, well perfused.   NEUROLOGIC: Alert, awake.   SKIN: No rash.   LYMPHATICS: No edema.     DATA:   Recent Labs   Lab Test  09/12/17   1105  03/13/17   1026  09/16/16   1045  06/07/16   0753  03/18/16   1205   NA  137  138  135  138  139   POTASSIUM  4.5  4.6  5.0  5.0  " 4.9   CHLORIDE  107  108  105  108  111*   CO2  23  21  24  22  21   ANIONGAP  7  9  6  8  7   BUN  39*  38*  33*  34*  34*   CR  1.79*  1.76*  1.73*  1.61*  1.59*   GLC  235*  252*  250*  167*  164*   CARSON  9.7  9.5  8.8  8.7  9.0     Recent Labs   Lab Test  09/28/15   0829  09/27/15   1032  09/26/15   0556  09/25/15   0705  09/24/15   0705  09/23/15   0645  09/22/15   0630   MAG  1.8  2.0  1.7  1.9  1.8  1.7   --    PHOS  3.3   --    --   2.8  2.8  2.6  4.0     Recent Labs   Lab Test  09/12/17   1105  03/13/17   1026  09/16/16   1045  06/07/16   0753  03/18/16   1205   WBC  8.2  7.3  6.2  6.5  5.4   HGB  13.4  14.4  12.5*  12.7*  11.5*   PLT  191  162  135*  131*  131*   MCV  93  95  95  95  96   NEUTROPHIL  77.8  72.0  68.9  72.7  72.8     Recent Labs   Lab Test  09/12/17   1105  03/13/17   1026  09/16/16   1045   BILITOTAL  1.1  0.9  0.6   ALKPHOS  76  57  55   ALT  28  41  27   AST  14  27  19   ALBUMIN  3.5  3.5  3.3*   LDH  183  188  172       Results for orders placed or performed during the hospital encounter of 09/12/17   CT Chest Abdomen Pelvis w/o Contrast    Narrative    CT CHEST/ABDOMEN/PELVIS WITHOUT CONTRAST September 12, 2017 11:17 AM     HISTORY: Malignant neoplasm of overlapping sites of bladder.     COMPARISON: CT chest, abdomen and pelvis 3/13/2017.    TECHNIQUE: Axial images from the thoracic inlet to the symphysis are  performed with additional coronal reformatted images. No IV contrast  is utilized. Oral contrast is administered. Radiation dose for this  scan was reduced using automated exposure control, adjustment of the  mA and/or kV according to patient size, or iterative reconstruction  technique.    FINDINGS:     Chest: The calcified granulomas described on prior exam are stable. No  new lung lesions are appreciated. No pleural or pericardial fluid. The  heart is normal in size. Esophagus is unremarkable. No enlarged lymph  nodes. Thyroid gland appears normal in size. Calcified plaque is  noted  in the aorta and scattered throughout the coronary arteries. No  evidence of aortic aneurysm.    Abdomen: Pneumobilia and air in the gallbladder is unchanged likely  from prior sphincterotomy procedure. No calcified gallstones are  appreciated. The fatty liver infiltration seen on prior exam appears  to have resolved. 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 Extensive calcified plaque is noted in the splenic artery and  aorta. No aortic aneurysm. Nonobstructing stones are present in the  left kidney, measuring larger than on the prior exam. No  hydronephrosis. No right urinary tract calculi. Cyst in the mid to  lower pole right kidney is stable measuring approximately 0.2 cm on  image 79 of series 2. This is a simple cyst. The spleen, pancreas and  adrenal glands are within normal limits allowing for the noncontrast  technique. No enlarged lymph nodes. The bowel is normal in caliber  without obstruction or diverticulitis. Appendix is normal. Anastomosis  right lateral abdomen is patent.    Pelvis: Prior cystoprostatectomy with ileal loop diversion. No  evidence of recurrent mass. The rectum is unremarkable with mild wall  thickening which is stable and possibly related to previous radiation  change or incomplete distention. No enlarged pelvic lymph nodes.  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 Prominent left external iliac artery and internal iliac artery  are also unchanged. Bone window examination demonstrates degenerative  SI joint and spine changes.      Impression    IMPRESSION:  1. No significant interval change since prior exam. No evidence of  recurrent pelvic mass. No enlarged lymph nodes. Calcified granulomas  in the lungs are stable. No new lung lesions are present.  2. 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  3. Nonobstructing left  renal collecting system stones and right renal  cyst are stable. No hydronephrosis.    JACQUELYN SHARMA MD       ASSESSMENT:    1. Muscle invasive bladder cancer - post dose dense MVAC ypT1N0 disease  2. CKD stage III; with some decline in function  3. Prostate cancer s/p xrt, TIA - 2010, A fib on warfarin, DM II    I have reviewed actual images from his recent staging CT scan. All his previous lung lesions are stable. Nothing to suggest recurrent disease based on his CT scans, labs or symptoms    His kidney function remains stable. He does have HTN and DM TII in addition to his CKD. He notes that he was hyperkalemic just a few weeks ago and the nutritionist went on and on for 15 minutes about what he should and should not eat. This was sometimes not consistent with recommendation of his nephrologist. He has stopped his soda and also V8 juice in the am. His potassium is completely normal at 4.5 today.     I explained that we could follow him every 6 months. I have suggested that he get his labs, scans and urine exam - week to 10 days prior to clinic visit if feasible. Urine cytology is negative and FISH suggests inadequate due to cell counts.     Over 25 min of direct face to face time spent with patient with more than 50% time spent in counseling and coordinating care.

## 2017-09-19 NOTE — MR AVS SNAPSHOT
After Visit Summary   9/19/2017    Baldo Landon    MRN: [id number]           Patient Information     Date Of Birth          1940        Visit Information        Provider Department      9/19/2017 12:15 PM Bradford Pires MD [facility]        Today's Diagnoses     Malignant neoplasm of overlapping sites of bladder (H)    -  1      Care Instructions    1.  Proceed with chemotherapy N/A    2.  Therapy/Supportive Treatment NO       3.  Does the patient require further chemotherapy N/A    4.  Appointments:  Follow up with me in 6 months-    5.  Labs:  CBC w Diff, CMP and LDH; urine for cytology and FISH a week to 10 days prior to visit    6.  Imaging:  CT Scan Chest/abd/pelvis 10 days prior to visit    7.  Other No             Follow-ups after your visit        Your next 10 appointments already scheduled     Mar 28, 2018 12:00 PM CDT   Return Visit with  ONCOLOGY NURSE   [facility] ([facility])    [facility]  [address]  [facility] [address] [phone]            Mar 28, 2018 12:30 PM CDT   CT CHEST/ABDOMEN/PELVIS W CONTRAST with [id number]   [facility] ([facility])    [address]  [facility] [id number] [phone]           Please bring any scans or X-rays taken at other hospitals, if similar tests were done. Also bring a list of your medicines, including vitamins, minerals and over-the-counter drugs. It is safest to leave personal items at home.  Be sure to tell your doctor:   If you have any allergies.   If there s any chance you are pregnant.   If you are breastfeeding.   If you have any special needs.  You may have contrast for this exam. To prepare:   Do not eat or drink for 2 hours before your exam. If you need to take medicine, you may take it with small sips of water. (We may ask you to take liquid medicine as  well.)   The day before your exam, drink extra fluids at least six 8-ounce glasses (unless your doctor tells you to restrict your fluids).  Patients over 70 or patients with diabetes or kidney problems:   If you haven t had a blood test (creatinine test) within the last 30 days, go to your clinic or Diagnostic Imaging Department for this test.  If you have diabetes:   If your kidney function is normal, continue taking your metformin (Avandamet, Glucophage, Glucovance, Metaglip) on the day of your exam.   If your kidney function is abnormal, wait 48 hours before restarting this medicine.  You will have oral contrast for this exam:   You will drink the contrast at home. Get this from your clinic or Diagnostic Imaging Department. Please follow the directions given.  Please wear loose clothing, such as a sweat suit or jogging clothes. Avoid snaps, zippers and other metal. We may ask you to undress and put on a hospital gown.  If you have any questions, please call the Imaging Department where you will have your exam.            Mar 28, 2018  1:30 PM CDT   Return Visit with Ruddy Betancourt MD   [facility] ([facility])    [facility]  [address]  [facility] [address] [phone]            Apr 06, 2018 11:15 AM CDT   Return Visit with Bradford Pires MD   [facility] ([facility])    [facility]  [address]  [facility] [address]   [phone]              Future tests that were ordered for you today     Open Future Orders        Priority Expected Expires Ordered    CT Chest/Abdomen/Pelvis w Contrast Routine  9/19/2018 9/19/2017            Who to contact     If you have questions or need follow up information about today's clinic visit or your schedule please contact [facility] directly at [phone].  Normal or non-critical lab  "and imaging results will be communicated to you by MyChart, letter or phone within 4 business days after the clinic has received the results. If you do not hear from us within 7 days, please contact the clinic through Aud[employer] or phone. If you have a critical or abnormal lab result, we will notify you by phone as soon as possible.  Submit refill requests through Audioscribe or call your pharmacy and they will forward the refill request to us. Please allow 3 business days for your refill to be completed.          Additional Information About Your Visit        [employer] Information     Aud[employer] gives you secure access to your electronic health record. If you see a primary care provider, you can also send messages to your care team and make appointments. If you have questions, please call your primary care clinic.  If you do not have a primary care provider, please call [phone] and they will assist you.        Care EveryWhere ID     This is your Care EveryWhere ID. This could be used by other organizations to access your [facility] medical records  [id number]        Your Vitals Were     Pulse Temperature Respirations Height Pulse Oximetry BMI (Body Mass Index)    67 97.2  F (36.2  C) (Tympanic) 16 1.778 m (5' 10\") 97% 25.02 kg/m2       Blood Pressure from Last 3 Encounters:   09/19/17 116/72   03/22/17 128/80   03/21/17 121/76    Weight from Last 3 Encounters:   09/19/17 79.1 kg (174 lb 6.4 oz)   03/22/17 79.4 kg (175 lb)   03/21/17 78.9 kg (174 lb)                 Today's Medication Changes          These changes are accurate as of: 9/19/17 12:58 PM.  If you have any questions, ask your nurse or doctor.               Stop taking these medicines if you haven't already. Please contact your care team if you have questions.     LISINOPRIL PO   Stopped by:  Bradford Pires MD                    Primary Care Provider Office Phone # Fax #    Luis Monge [phone]       [address] " [place] [facility] [address]        Equal Access to Services     MAXX ONEAL : Hadii nathanael montesinos julio Albaradoali, waaxda luqadaha, qaybta pedrofreddiedidier carrillo. So [facility] [phone].    ATENCIÓN: Si habla español, tiene a dudley disposición servicios gratuitos de asistencia lingüística. Bradleyame al [phone].    We comply with applicable federal civil rights laws and [place] laws. We do not discriminate on the basis of race, color, national origin, age, disability sex, sexual orientation or gender identity.            Thank you!     Thank you for choosing [facility]  for your care. Our goal is always to provide you with excellent care. Hearing back from our patients is one way we can continue to improve our services. Please take a few minutes to complete the written survey that you may receive in the mail after your visit with us. Thank you!             Your Updated Medication List - Protect others around you: Learn how to safely use, store and throw away your medicines at www.disposemymeds.org.          This list is accurate as of: 9/19/17 12:58 PM.  Always use your most recent med list.                   Brand Name Dispense Instructions for use Diagnosis    DAILY MULTIVITAMIN PO      Take 1 tablet by mouth daily        DIGOXIN PO      Take 125 mcg by mouth daily 1/2 tablet once daily        GENTLE STOOL SOFTENER PO      Take 100 mg by mouth daily    Malignant neoplasm of overlapping sites of bladder (H)       GLIPIZIDE PO      Take 10 mg by mouth 2 times daily (before meals)        LIPITOR PO      Take 80 mg by mouth daily 1/2 tablet daily    Malignant neoplasm of overlapping sites of bladder (H)       losartan 25 MG tablet    COZAAR     Take 25 mg by mouth daily    Malignant neoplasm of overlapping sites of bladder (H)       metoprolol 100 MG tablet    LOPRESSOR    60 tablet    Take 1 tablet (100 mg) by mouth 2 times daily    Chronic atrial fibrillation  (H)       order for DME     1 Month    Equipment being ordered: Urostomy supplies    Bladder cancer (H)       saxagliptin 2.5 MG Tabs tablet    ONGLYZA     Take by mouth daily    Malignant neoplasm of overlapping sites of bladder (H)       warfarin 5 MG tablet    COUMADIN    30 tablet    Take 5 mg by mouth daily Except 1/2 pill on Monday    Chronic atrial fibrillation (H)

## 2018-03-28 ENCOUNTER — ONCOLOGY VISIT (OUTPATIENT)
Dept: ONCOLOGY | Facility: CLINIC | Age: 78
End: 2018-03-28
Attending: INTERNAL MEDICINE
Payer: MEDICARE

## 2018-03-28 ENCOUNTER — HOSPITAL ENCOUNTER (OUTPATIENT)
Facility: CLINIC | Age: 78
Setting detail: SPECIMEN
Discharge: HOME | End: 2018-03-28
Attending: INTERNAL MEDICINE | Admitting: INTERNAL MEDICINE
Payer: MEDICARE

## 2018-03-28 ENCOUNTER — ONCOLOGY VISIT (OUTPATIENT)
Dept: ONCOLOGY | Facility: CLINIC | Age: 78
End: 2018-03-28
Attending: UROLOGY
Payer: MEDICARE

## 2018-03-28 ENCOUNTER — HOSPITAL ENCOUNTER (OUTPATIENT)
Dept: CT IMAGING | Facility: CLINIC | Age: 78
Discharge: HOME | End: 2018-03-28
Attending: INTERNAL MEDICINE | Admitting: INTERNAL MEDICINE
Payer: MEDICARE

## 2018-03-28 VITALS
DIASTOLIC BLOOD PRESSURE: 69 MMHG | TEMPERATURE: 97.6 F | HEART RATE: 82 BPM | HEIGHT: 70 IN | RESPIRATION RATE: 18 BRPM | OXYGEN SATURATION: 98 % | SYSTOLIC BLOOD PRESSURE: 107 MMHG | WEIGHT: 173.6 LBS | BODY MASS INDEX: 24.85 KG/M2

## 2018-03-28 DIAGNOSIS — C67.8 MALIGNANT NEOPLASM OF OVERLAPPING SITES OF BLADDER (H): Primary | ICD-10-CM

## 2018-03-28 DIAGNOSIS — C67.8 MALIGNANT NEOPLASM OF OVERLAPPING SITES OF BLADDER (H): ICD-10-CM

## 2018-03-28 LAB
ALBUMIN SERPL-MCNC: 3.2 G/DL (ref 3.4–5)
ALP SERPL-CCNC: 103 U/L (ref 40–150)
ALT SERPL W P-5'-P-CCNC: 27 U/L (ref 0–70)
ANION GAP SERPL CALCULATED.3IONS-SCNC: 8 MMOL/L (ref 3–14)
AST SERPL W P-5'-P-CCNC: 14 U/L (ref 0–45)
BASOPHILS # BLD AUTO: 0 10E9/L (ref 0–0.2)
BASOPHILS NFR BLD AUTO: 0.5 %
BILIRUB SERPL-MCNC: 0.8 MG/DL (ref 0.2–1.3)
BUN SERPL-MCNC: 40 MG/DL (ref 7–30)
CALCIUM SERPL-MCNC: 9 MG/DL (ref 8.5–10.1)
CHLORIDE SERPL-SCNC: 110 MMOL/L (ref 94–109)
CO2 SERPL-SCNC: 21 MMOL/L (ref 20–32)
CREAT SERPL-MCNC: 1.89 MG/DL (ref 0.66–1.25)
DIFFERENTIAL METHOD BLD: ABNORMAL
EOSINOPHIL # BLD AUTO: 0.1 10E9/L (ref 0–0.7)
EOSINOPHIL NFR BLD AUTO: 1.4 %
ERYTHROCYTE [DISTWIDTH] IN BLOOD BY AUTOMATED COUNT: 14.8 % (ref 10–15)
GFR SERPL CREATININE-BSD FRML MDRD: 35 ML/MIN/1.7M2
GLUCOSE SERPL-MCNC: 291 MG/DL (ref 70–99)
HCT VFR BLD AUTO: 39.6 % (ref 40–53)
HGB BLD-MCNC: 12.8 G/DL (ref 13.3–17.7)
IMM GRANULOCYTES # BLD: 0.1 10E9/L (ref 0–0.4)
IMM GRANULOCYTES NFR BLD: 0.9 %
LDH SERPL L TO P-CCNC: 147 U/L (ref 85–227)
LYMPHOCYTES # BLD AUTO: 1.2 10E9/L (ref 0.8–5.3)
LYMPHOCYTES NFR BLD AUTO: 18.5 %
MCH RBC QN AUTO: 30 PG (ref 26.5–33)
MCHC RBC AUTO-ENTMCNC: 32.3 G/DL (ref 31.5–36.5)
MCV RBC AUTO: 93 FL (ref 78–100)
MONOCYTES # BLD AUTO: 0.5 10E9/L (ref 0–1.3)
MONOCYTES NFR BLD AUTO: 8.5 %
NEUTROPHILS # BLD AUTO: 4.5 10E9/L (ref 1.6–8.3)
NEUTROPHILS NFR BLD AUTO: 70.2 %
NRBC # BLD AUTO: 0 10*3/UL
NRBC BLD AUTO-RTO: 0 /100
PLATELET # BLD AUTO: 136 10E9/L (ref 150–450)
POTASSIUM SERPL-SCNC: 4.8 MMOL/L (ref 3.4–5.3)
PROT SERPL-MCNC: 7.2 G/DL (ref 6.8–8.8)
RBC # BLD AUTO: 4.27 10E12/L (ref 4.4–5.9)
SODIUM SERPL-SCNC: 139 MMOL/L (ref 133–144)
WBC # BLD AUTO: 6.4 10E9/L (ref 4–11)

## 2018-03-28 PROCEDURE — 71250 CT THORAX DX C-: CPT

## 2018-03-28 PROCEDURE — 83615 LACTATE (LD) (LDH) ENZYME: CPT | Performed by: INTERNAL MEDICINE

## 2018-03-28 PROCEDURE — 88112 CYTOPATH CELL ENHANCE TECH: CPT | Performed by: INTERNAL MEDICINE

## 2018-03-28 PROCEDURE — 80053 COMPREHEN METABOLIC PANEL: CPT | Performed by: INTERNAL MEDICINE

## 2018-03-28 PROCEDURE — 00000103 ZZHCL STATISTIC CYTO-FISH QC 88120: Performed by: INTERNAL MEDICINE

## 2018-03-28 PROCEDURE — 36415 COLL VENOUS BLD VENIPUNCTURE: CPT

## 2018-03-28 PROCEDURE — 99213 OFFICE O/P EST LOW 20 MIN: CPT | Performed by: UROLOGY

## 2018-03-28 PROCEDURE — G0463 HOSPITAL OUTPT CLINIC VISIT: HCPCS

## 2018-03-28 PROCEDURE — 85025 COMPLETE CBC W/AUTO DIFF WBC: CPT | Performed by: INTERNAL MEDICINE

## 2018-03-28 PROCEDURE — 88112 CYTOPATH CELL ENHANCE TECH: CPT | Mod: 26 | Performed by: INTERNAL MEDICINE

## 2018-03-28 RX ORDER — METOPROLOL TARTRATE 50 MG
75 TABLET ORAL
COMMUNITY

## 2018-03-28 ASSESSMENT — PAIN SCALES - GENERAL: PAINLEVEL: NO PAIN (0)

## 2018-03-28 NOTE — LETTER
"    3/28/2018         RE: Baldo Landon  PO   [facility] [address]        Dear Colleague,    Thank you for referring your patient, Baldo Landon, to the [facility]. Please see a copy of my visit note below.    Mr Landon is a pleasant 77 year old man here with history of a cystoprostatectomy performed on 9/15/2015 with a post-op course complicated with a-fib, prolonged ileus.  However doing really well now.      Neoadjuvant chemotherapy MVAC  TMN Stage: [id number], Original stage was T2  Number of nodes removed: 17  Number of positive nodes: 0  Surgical Margins : negative  Grade: high  Histology: urothelial without secondary histology  Surgery: Radical Cx and Ileal conduit 9/15    No problems with ostomy device.  No blood in urine.  No flank pain.      PE  /69  Pulse 82  Temp 97.6  F (36.4  C) (Tympanic)  Resp 18  Ht 1.778 m (5' 10\")  Wt 78.7 kg (173 lb 9.6 oz)  SpO2 98%  BMI 24.91 kg/m2    Incision sites are healed well.  Slight bulge around stoma, but no hernia on imaging    CT shows no evidence of disease and no hydro to explain slight Cr rise. Large kidney cyst, non contrast scan    Lab Results   Component Value Date    CR 1.89 03/28/2018    CR 1.79 09/12/2017    CR 1.76 03/13/2017    CR 1.73 09/16/2016    CR 1.61 06/07/2016    CR 1.59 03/18/2016    CR 1.43 11/10/2015    CR 1.12 09/28/2015    CR 1.09 09/26/2015    CR 1.07 09/25/2015     Recently had a colonoscopy and had a little setback from that, but feeling much better now    Assessment history of pT2(T1 after MVAC)[id number] urothelial cancer with no evidence of disease s/p Rad Cx 9/15    Plan:    We will follow up in [place] in ~6 months with CT a/p and cbc, bmp    Imaging and labs per Dr. Pires    Doing well overall     Ruddy Betancourt MD  Department of Urology Staff  [facility]      Results for BALDO LANDON (MRN [id number]) as of 3/28/2018 14:03   Ref. Range 3/28/2018 11:58   Sodium Latest Ref Range: " 133 - 144 mmol/L 139   Potassium Latest Ref Range: 3.4 - 5.3 mmol/L 4.8   Chloride Latest Ref Range: 94 - 109 mmol/L 110 (H)   Carbon Dioxide Latest Ref Range: 20 - 32 mmol/L 21   Urea Nitrogen Latest Ref Range: 7 - 30 mg/dL 40 (H)   Creatinine Latest Ref Range: 0.66 - 1.25 mg/dL 1.89 (H)   GFR Estimate Latest Ref Range: >60 mL/min/1.7m2 35 (L)   GFR Estimate If Black Latest Ref Range: >60 mL/min/1.7m2 42 (L)   Calcium Latest Ref Range: 8.5 - 10.1 mg/dL 9.0   Anion Gap Latest Ref Range: 3 - 14 mmol/L 8   Albumin Latest Ref Range: 3.4 - 5.0 g/dL 3.2 (L)   Protein Total Latest Ref Range: 6.8 - 8.8 g/dL 7.2   Bilirubin Total Latest Ref Range: 0.2 - 1.3 mg/dL 0.8   Alkaline Phosphatase Latest Ref Range: 40 - 150 U/L 103   ALT Latest Ref Range: 0 - 70 U/L 27   AST Latest Ref Range: 0 - 45 U/L 14   Lactate Dehydrogenase Latest Ref Range: 85 - 227 U/L 147   Glucose Latest Ref Range: 70 - 99 mg/dL 291 (H)   WBC Latest Ref Range: 4.0 - 11.0 10e9/L 6.4   Hemoglobin Latest Ref Range: 13.3 - 17.7 g/dL 12.8 (L)   Hematocrit Latest Ref Range: 40.0 - 53.0 % 39.6 (L)   Platelet Count Latest Ref Range: 150 - 450 10e9/L 136 (L)   RBC Count Latest Ref Range: 4.4 - 5.9 10e12/L 4.27 (L)   MCV Latest Ref Range: 78 - 100 fl 93   MCH Latest Ref Range: 26.5 - 33.0 pg 30.0   MCHC Latest Ref Range: 31.5 - 36.5 g/dL 32.3   RDW Latest Ref Range: 10.0 - 15.0 % 14.8   Diff Method Unknown Automated Method   % Neutrophils Latest Units: % 70.2   % Lymphocytes Latest Units: % 18.5   % Monocytes Latest Units: % 8.5   % Eosinophils Latest Units: % 1.4   % Basophils Latest Units: % 0.5   % Immature Granulocytes Latest Units: % 0.9   Nucleated RBCs Latest Ref Range: 0 /100 0   Absolute Neutrophil Latest Ref Range: 1.6 - 8.3 10e9/L 4.5   Absolute Lymphocytes Latest Ref Range: 0.8 - 5.3 10e9/L 1.2   Absolute Monocytes Latest Ref Range: 0.0 - 1.3 10e9/L 0.5   Absolute Eosinophils Latest Ref Range: 0.0 - 0.7 10e9/L 0.1   Absolute Basophils Latest Ref Range:  0.0 - 0.2 10e9/L 0.0   Abs Immature Granulocytes Latest Ref Range: 0 - 0.4 10e9/L 0.1   Absolute Nucleated RBC Unknown 0.0       Again, thank you for allowing me to participate in the care of your patient.        Sincerely,        Ruddy Betancourt MD

## 2018-03-28 NOTE — MR AVS SNAPSHOT
After Visit Summary   3/28/2018    Baldo Landon    MRN: [id number]           Patient Information     Date Of Birth          1940        Visit Information        Provider Department      3/28/2018 1:30 PM Ruddy Betancourt MD [facility] RH Oncology [facility]      Care Instructions    Follow up in 6 months           Follow-ups after your visit        Your next 10 appointments already scheduled     Apr 06, 2018 11:15 AM CDT   Return Visit with Bradford Pires MD   [facility] ([facility])    [facility]  [address]  [facility] [address]   [phone]            Sep 26, 2018  2:00 PM CDT   Return Visit with Ruddy Betancourt MD   [facility] ([facility])    [facility]  [address]  [facility] [address]   [phone]              Future tests that were ordered for you today     Open Future Orders        Priority Expected Expires Ordered    CT Chest Abdomen Pelvis w/o Contrast Routine  9/19/2018 9/19/2017            Who to contact     If you have questions or need follow up information about today's clinic visit or your schedule please contact [facility] CANCER [facility] directly at [phone].  Normal or non-critical lab and imaging results will be communicated to you by MyChart, letter or phone within 4 business days after the clinic has received the results. If you do not hear from us within 7 days, please contact the clinic through MyChart or phone. If you have a critical or abnormal lab result, we will notify you by phone as soon as possible.  Submit refill requests through [employer] or call your pharmacy and they will forward the refill request to us. Please allow 3 business days for your refill to be completed.          Additional Information About Your Visit        MyChart Information     [employer]  "gives you secure access to your electronic health record. If you see a primary care provider, you can also send messages to your care team and make appointments. If you have questions, please call your primary care clinic.  If you do not have a primary care provider, please call [phone] and they will assist you.        Care EveryWhere ID     This is your Care EveryWhere ID. This could be used by other organizations to access your [place] medical records  [id number]        Your Vitals Were     Pulse Temperature Respirations Height Pulse Oximetry BMI (Body Mass Index)    82 97.6  F (36.4  C) (Tympanic) 18 1.778 m (5' 10\") 98% 24.91 kg/m2       Blood Pressure from Last 3 Encounters:   03/28/18 107/69   09/19/17 116/72   03/22/17 128/80    Weight from Last 3 Encounters:   03/28/18 78.7 kg (173 lb 9.6 oz)   09/19/17 79.1 kg (174 lb 6.4 oz)   03/22/17 79.4 kg (175 lb)              Today, you had the following     No orders found for display         Today's Medication Changes          These changes are accurate as of 3/28/18  2:18 PM.  If you have any questions, ask your nurse or doctor.               These medicines have changed or have updated prescriptions.        Dose/Directions    metoprolol tartrate 50 MG tablet   Commonly known as:  LOPRESSOR   This may have changed:  Another medication with the same name was removed. Continue taking this medication, and follow the directions you see here.   Changed by:  Ruddy Betancourt MD        Dose:  75 mg   Take 75 mg by mouth   Refills:  0         Stop taking these medicines if you haven't already. Please contact your care team if you have questions.     GENTLE STOOL SOFTENER PO   Stopped by:  Ruddy Betancourt MD                    Primary Care Provider Office Phone # Fax #    Luis Monge [phone]       [facility] [address] [place] [id number]        Equal Access to Services     MAXX ONEAL AH: Hadii aad ku " julio Singh, marianada luquintinadaha, qaalinata kafreddieda finamelissa, didier goldyin hayaakarri hardenrosalina yeimyoseas laDenispardeep nikkie. So [facility] [phone].    ATENCIÓN: Si habla raul, tiene a dudley disposición servicios gratuitos de asistencia lingüística. Giovanni al [phone].    We comply with applicable federal civil rights laws and [place] laws. We do not discriminate on the basis of race, color, national origin, age, disability, sex, sexual orientation, or gender identity.            Thank you!     Thank you for choosing [facility] CANCER CARE  for your care. Our goal is always to provide you with excellent care. Hearing back from our patients is one way we can continue to improve our services. Please take a few minutes to complete the written survey that you may receive in the mail after your visit with us. Thank you!             Your Updated Medication List - Protect others around you: Learn how to safely use, store and throw away your medicines at www.disposemymeds.org.          This list is accurate as of 3/28/18  2:18 PM.  Always use your most recent med list.                   Brand Name Dispense Instructions for use Diagnosis    DAILY MULTIVITAMIN PO      Take 1 tablet by mouth daily        DIGOXIN PO      Take 125 mcg by mouth daily 1/2 tablet once daily        GLIPIZIDE PO      Take 10 mg by mouth 2 times daily (before meals)        LIPITOR PO      Take 80 mg by mouth daily 1/2 tablet daily    Malignant neoplasm of overlapping sites of bladder (H)       losartan 25 MG tablet    COZAAR     Take 25 mg by mouth daily    Malignant neoplasm of overlapping sites of bladder (H)       metoprolol tartrate 50 MG tablet    LOPRESSOR     Take 75 mg by mouth        order for DME     1 Month    Equipment being ordered: Urostomy supplies    Bladder cancer (H)       saxagliptin 2.5 MG Tabs tablet    ONGLYZA     Take by mouth daily    Malignant neoplasm of overlapping sites of bladder (H)       warfarin 5 MG tablet    COUMADIN     30 tablet    Take 5 mg by mouth daily Except 1/2 pill on Monday    Chronic atrial fibrillation (H)

## 2018-03-28 NOTE — PROGRESS NOTES
Medical Assistant Note:  Baldo Landon presents today for lab draw and UA.    Patient seen by provider today: No.   present during visit today: Not Applicable.    Concerns: No Concerns.    Procedure:  Labs drawn: .    Post Assessment:  Labs drawn without difficulty: Yes.    Discharge Plan:  Departure Mode: Ambulatory.    Face to Face Time: 10.    Steph Green

## 2018-03-28 NOTE — LETTER
3/28/2018         RE: Baldo Landon  [address]   [facility] [address]        Dear Colleague,    Thank you for referring your patient, Baldo Landon, to the [facility] CANCER CARE. Please see a copy of my visit note below.    Medical Assistant Note:  Baldo Landon presents today for lab draw and UA.    Patient seen by provider today: No.   present during visit today: Not Applicable.    Concerns: No Concerns.    Procedure:  Labs drawn: .    Post Assessment:  Labs drawn without difficulty: Yes.    Discharge Plan:  Departure Mode: Ambulatory.    Face to Face Time: 10.    Steph Green              Again, thank you for allowing me to participate in the care of your patient.        Sincerely,        [facility] Oncology Nurse

## 2018-03-28 NOTE — MR AVS SNAPSHOT
After Visit Summary   3/28/2018    Baldo Landon    MRN: [id number]           Patient Information     Date Of Birth          1940        Visit Information        Provider Department      3/28/2018 12:00 PM  ONCOLOGY NURSE [facility]        Today's Diagnoses     Malignant neoplasm of overlapping sites of bladder (H)           Follow-ups after your visit        Your next 10 appointments already scheduled     Mar 28, 2018  1:00 PM CDT   CT CHEST/ABDOMEN/PELVIS W CONTRAST with RSCCCT1   [facility] ([facility])    [address]  [facility] [address] [phone]           Please bring any scans or X-rays taken at other hospitals, if similar tests were done. Also bring a list of your medicines, including vitamins, minerals and over-the-counter drugs. It is safest to leave personal items at home.  Be sure to tell your doctor:   If you have any allergies.   If there s any chance you are pregnant.   If you are breastfeeding.  You may have contrast for this exam. To prepare:   Do not eat or drink for 2 hours before your exam. If you need to take medicine, you may take it with small sips of water. (We may ask you to take liquid medicine as well.)   The day before your exam, drink extra fluids at least six 8-ounce glasses (unless your doctor tells you to restrict your fluids).   You will be given instructions on how to drink the contrast.  Patients over 70 or patients with diabetes or kidney problems:   If you haven t had a blood test (creatinine test) within the last 30 days, the Cardiologist/Radiologist may require you to get this test prior to your exam.  If you have diabetes:   Continue to take your metformin medication on the day of your exam  Please wear loose clothing, such as a sweat suit or jogging clothes. Avoid snaps, zippers and other metal. We may ask you to undress and put on a hospital gown.  If you  have any questions, please call the Imaging Department where you will have your exam.            Mar 28, 2018  1:30 PM CDT   Return Visit with Ruddy Betancourt MD   [facility] ([facility])    [facility]  [address]  [facility] [address]   [phone]            Apr 06, 2018 11:15 AM CDT   Return Visit with Bradford Pires MD   [facility] ([facility])    [facility]  [address]  [facility] [address]   [phone]              Who to contact     If you have questions or need follow up information about today's clinic visit or your schedule please contact [facility] CANCER [facility] directly at [phone].  Normal or non-critical lab and imaging results will be communicated to you by MyChart, letter or phone within 4 business days after the clinic has received the results. If you do not hear from us within 7 days, please contact the clinic through [employer] or phone. If you have a critical or abnormal lab result, we will notify you by phone as soon as possible.  Submit refill requests through [employer] or call your pharmacy and they will forward the refill request to us. Please allow 3 business days for your refill to be completed.          Additional Information About Your Visit        MyChart Information     [employer] gives you secure access to your electronic health record. If you see a primary care provider, you can also send messages to your care team and make appointments. If you have questions, please call your primary care clinic.  If you do not have a primary care provider, please call [phone] and they will assist you.        Care EveryWhere ID     This is your Care EveryWhere ID. This could be used by other organizations to access your [place] medical records  [id number]         Blood Pressure from Last 3 Encounters:   09/19/17  116/72   03/22/17 128/80   03/21/17 121/76    Weight from Last 3 Encounters:   09/19/17 79.1 kg (174 lb 6.4 oz)   03/22/17 79.4 kg (175 lb)   03/21/17 78.9 kg (174 lb)              We Performed the Following     CBC with platelets differential     Comprehensive metabolic panel     Cytology non gyn     Lactate Dehydrogenase        Primary Care Provider Office Phone # Fax #    Luis Monge [phone]       [facility] [address] [facility] [address]        Equal Access to Services     [facility] : Hadii nathanael montesinos hadashgerard Sojhonathan, waaxda luqadaha, qaybta kaalmada yuridia, didier lang . So [facility] [phone].    ATENCIÓN: Si habla español, tiene a dudley disposición servicios gratuitos de asistencia lingüística. [facility] [phone].    We comply with applicable federal civil rights laws and [place] laws. We do not discriminate on the basis of race, color, national origin, age, disability, sex, sexual orientation, or gender identity.            Thank you!     Thank you for choosing [facility] CANCER CARE  for your care. Our goal is always to provide you with excellent care. Hearing back from our patients is one way we can continue to improve our services. Please take a few minutes to complete the written survey that you may receive in the mail after your visit with us. Thank you!             Your Updated Medication List - Protect others around you: Learn how to safely use, store and throw away your medicines at www.disposemymeds.org.          This list is accurate as of 3/28/18 12:06 PM.  Always use your most recent med list.                   Brand Name Dispense Instructions for use Diagnosis    DAILY MULTIVITAMIN PO      Take 1 tablet by mouth daily        DIGOXIN PO      Take 125 mcg by mouth daily 1/2 tablet once daily        GENTLE STOOL SOFTENER PO      Take 100 mg by mouth daily    Malignant neoplasm of overlapping sites of bladder (H)        GLIPIZIDE PO      Take 10 mg by mouth 2 times daily (before meals)        LIPITOR PO      Take 80 mg by mouth daily 1/2 tablet daily    Malignant neoplasm of overlapping sites of bladder (H)       losartan 25 MG tablet    COZAAR     Take 25 mg by mouth daily    Malignant neoplasm of overlapping sites of bladder (H)       metoprolol tartrate 100 MG tablet    LOPRESSOR    60 tablet    Take 1 tablet (100 mg) by mouth 2 times daily    Chronic atrial fibrillation (H)       order for DME     1 Month    Equipment being ordered: Urostomy supplies    Bladder cancer (H)       saxagliptin 2.5 MG Tabs tablet    ONGLYZA     Take by mouth daily    Malignant neoplasm of overlapping sites of bladder (H)       warfarin 5 MG tablet    COUMADIN    30 tablet    Take 5 mg by mouth daily Except 1/2 pill on Monday    Chronic atrial fibrillation (H)

## 2018-03-28 NOTE — NURSING NOTE
"Oncology Rooming Note    March 28, 2018 1:44 PM   Baldo Landon is a 77 year old male who presents for:    Chief Complaint   Patient presents with     Oncology Clinic Visit     Follow up - Bladder cancer      Initial Vitals: /69  Pulse 82  Temp 97.6  F (36.4  C) (Tympanic)  Resp 18  Ht 1.778 m (5' 10\")  Wt 78.7 kg (173 lb 9.6 oz)  SpO2 98%  BMI 24.91 kg/m2 Estimated body mass index is 24.91 kg/(m^2) as calculated from the following:    Height as of this encounter: 1.778 m (5' 10\").    Weight as of this encounter: 78.7 kg (173 lb 9.6 oz). Body surface area is 1.97 meters squared.  No Pain (0) Comment: Data Unavailable   No LMP for male patient.  Allergies reviewed: Yes  Medications reviewed: Yes    Medications: Medication refills not needed today.  Pharmacy name entered into [employer]:    [place], [place] - [address] [facility] [address] [place] [address] [address] [facility]ACY [id number] [place], [place] - [address]    Clinical concerns: Follow up   8 minutes for nursing intake (face to face time)     Mariela Gomez CMA      DISCHARGE PLAN:  Next appointments: See patient instruction section  Departure Mode: Ambulatory  Accompanied by: wife  0 minutes for nursing discharge (face to face time)   Mariela Gomez CMA                "

## 2018-03-28 NOTE — PROGRESS NOTES
"Mr Landon is a pleasant 77 year old man here with history of a cystoprostatectomy performed on 9/15/2015 with a post-op course complicated with a-fib, prolonged ileus.  However doing really well now.      Neoadjuvant chemotherapy MVAC  TMN Stage: H2X4SpY0, Original stage was T2  Number of nodes removed: 17  Number of positive nodes: 0  Surgical Margins : negative  Grade: high  Histology: urothelial without secondary histology  Surgery: Radical Cx and Ileal conduit 9/15    No problems with ostomy device.  No blood in urine.  No flank pain.      PE  /69  Pulse 82  Temp 97.6  F (36.4  C) (Tympanic)  Resp 18  Ht 1.778 m (5' 10\")  Wt 78.7 kg (173 lb 9.6 oz)  SpO2 98%  BMI 24.91 kg/m2    Incision sites are healed well.  Slight bulge around stoma, but no hernia on imaging    CT shows no evidence of disease and no hydro to explain slight Cr rise. Large kidney cyst, non contrast scan    Lab Results   Component Value Date    CR 1.89 03/28/2018    CR 1.79 09/12/2017    CR 1.76 03/13/2017    CR 1.73 09/16/2016    CR 1.61 06/07/2016    CR 1.59 03/18/2016    CR 1.43 11/10/2015    CR 1.12 09/28/2015    CR 1.09 09/26/2015    CR 1.07 09/25/2015     Recently had a colonoscopy and had a little setback from that, but feeling much better now    Assessment history of pT2(T1 after MVAC)[id number] urothelial cancer with no evidence of disease s/p Rad Cx 9/15    Plan:    We will follow up in [place] in ~6 months with CT a/p and cbc, bmp    Imaging and labs per Dr. Pires    Doing well overall     Ruddy Betancourt MD  Department of Urology Staff  [facility]      Results for NIKKI LANDON (MRN [id number]) as of 3/28/2018 14:03   Ref. Range 3/28/2018 11:58   Sodium Latest Ref Range: 133 - 144 mmol/L 139   Potassium Latest Ref Range: 3.4 - 5.3 mmol/L 4.8   Chloride Latest Ref Range: 94 - 109 mmol/L 110 (H)   Carbon Dioxide Latest Ref Range: 20 - 32 mmol/L 21   Urea Nitrogen Latest Ref Range: 7 - 30 mg/dL 40 (H) "   Creatinine Latest Ref Range: 0.66 - 1.25 mg/dL 1.89 (H)   GFR Estimate Latest Ref Range: >60 mL/min/1.7m2 35 (L)   GFR Estimate If Black Latest Ref Range: >60 mL/min/1.7m2 42 (L)   Calcium Latest Ref Range: 8.5 - 10.1 mg/dL 9.0   Anion Gap Latest Ref Range: 3 - 14 mmol/L 8   Albumin Latest Ref Range: 3.4 - 5.0 g/dL 3.2 (L)   Protein Total Latest Ref Range: 6.8 - 8.8 g/dL 7.2   Bilirubin Total Latest Ref Range: 0.2 - 1.3 mg/dL 0.8   Alkaline Phosphatase Latest Ref Range: 40 - 150 U/L 103   ALT Latest Ref Range: 0 - 70 U/L 27   AST Latest Ref Range: 0 - 45 U/L 14   Lactate Dehydrogenase Latest Ref Range: 85 - 227 U/L 147   Glucose Latest Ref Range: 70 - 99 mg/dL 291 (H)   WBC Latest Ref Range: 4.0 - 11.0 10e9/L 6.4   Hemoglobin Latest Ref Range: 13.3 - 17.7 g/dL 12.8 (L)   Hematocrit Latest Ref Range: 40.0 - 53.0 % 39.6 (L)   Platelet Count Latest Ref Range: 150 - 450 10e9/L 136 (L)   RBC Count Latest Ref Range: 4.4 - 5.9 10e12/L 4.27 (L)   MCV Latest Ref Range: 78 - 100 fl 93   MCH Latest Ref Range: 26.5 - 33.0 pg 30.0   MCHC Latest Ref Range: 31.5 - 36.5 g/dL 32.3   RDW Latest Ref Range: 10.0 - 15.0 % 14.8   Diff Method Unknown Automated Method   % Neutrophils Latest Units: % 70.2   % Lymphocytes Latest Units: % 18.5   % Monocytes Latest Units: % 8.5   % Eosinophils Latest Units: % 1.4   % Basophils Latest Units: % 0.5   % Immature Granulocytes Latest Units: % 0.9   Nucleated RBCs Latest Ref Range: 0 /100 0   Absolute Neutrophil Latest Ref Range: 1.6 - 8.3 10e9/L 4.5   Absolute Lymphocytes Latest Ref Range: 0.8 - 5.3 10e9/L 1.2   Absolute Monocytes Latest Ref Range: 0.0 - 1.3 10e9/L 0.5   Absolute Eosinophils Latest Ref Range: 0.0 - 0.7 10e9/L 0.1   Absolute Basophils Latest Ref Range: 0.0 - 0.2 10e9/L 0.0   Abs Immature Granulocytes Latest Ref Range: 0 - 0.4 10e9/L 0.1   Absolute Nucleated RBC Unknown 0.0

## 2018-04-05 LAB
COPATH REPORT: NORMAL
COPATH REPORT: NORMAL

## 2018-04-06 ENCOUNTER — ONCOLOGY VISIT (OUTPATIENT)
Dept: ONCOLOGY | Facility: CLINIC | Age: 78
End: 2018-04-06
Attending: INTERNAL MEDICINE
Payer: COMMERCIAL

## 2018-04-06 VITALS
OXYGEN SATURATION: 98 % | HEIGHT: 70 IN | WEIGHT: 176 LBS | DIASTOLIC BLOOD PRESSURE: 73 MMHG | TEMPERATURE: 97.4 F | BODY MASS INDEX: 25.2 KG/M2 | RESPIRATION RATE: 16 BRPM | SYSTOLIC BLOOD PRESSURE: 115 MMHG | HEART RATE: 59 BPM

## 2018-04-06 DIAGNOSIS — C67.8 MALIGNANT NEOPLASM OF OVERLAPPING SITES OF BLADDER (H): Primary | ICD-10-CM

## 2018-04-06 PROCEDURE — G0463 HOSPITAL OUTPT CLINIC VISIT: HCPCS

## 2018-04-06 PROCEDURE — 99214 OFFICE O/P EST MOD 30 MIN: CPT | Performed by: INTERNAL MEDICINE

## 2018-04-06 ASSESSMENT — PAIN SCALES - GENERAL: PAINLEVEL: NO PAIN (0)

## 2018-04-06 NOTE — NURSING NOTE
"Oncology Rooming Note    April 6, 2018 11:41 AM   Baldo Landon is a 77 year old male who presents for:    Chief Complaint   Patient presents with     Oncology Clinic Visit     Bladder cancer      Initial Vitals: /73  Pulse 59  Temp 97.4  F (36.3  C) (Tympanic)  Resp 16  Ht 1.778 m (5' 10\")  Wt 79.8 kg (176 lb)  SpO2 98%  BMI 25.25 kg/m2 Estimated body mass index is 25.25 kg/(m^2) as calculated from the following:    Height as of this encounter: 1.778 m (5' 10\").    Weight as of this encounter: 79.8 kg (176 lb). Body surface area is 1.99 meters squared.  No Pain (0) Comment: Data Unavailable   No LMP for male patient.  Allergies reviewed: Yes  Medications reviewed: Yes    Medications: Medication refills not needed today.  Pharmacy name entered into EPIC:    [place], [place] - [address] [facility] - [place], [place] - [address] [facility]Y [id number] [place], [place] - [address]    Clinical concerns: follow up     8 minutes for nursing intake (face to face time)     Steph Green CMA     DISCHARGE PLAN:  Next appointments: See patient instruction section  Departure Mode: Ambulatory  Accompanied by: wife  0 minutes for nursing discharge (face to face time)   Steph Green CMA                    "

## 2018-04-06 NOTE — PROGRESS NOTES
"[facility] PHYSICIANS  HEMATOLOGY ONCOLOGY     DIAGNOSES:     1.  Muscle invasive bladder cancer diagnosed 05/11/2015.    2.  History of prostate cancer.      TREATMENT:     1.  TURBT, 05/11/2015.    2.  Cycle 1 neoadjuvant dose dense MVAC, methotrexate, vinblastine, doxorubicin and cyclophosphamide 06/11/2015 with Neulasta support.  Cycle 2 06/24/2015. Cycle 3 7/8/15, Cycle 4 7/22/15.  3.  Cystoprostatectomy - 9/15/15    SUBJECTIVE:  Baldo Landon comes in for a followup today. He is feeling well.    He had a scan done prior to clinic visit and he is here to review findings.     REVIEW OF SYSTEMS:  A complete review of systems was performed and found to be negative other than pertinent positives mentioned in history of present illness.     Past medical, social histories reviewed.    Meds- Reviewed.     PHYSICAL EXAMINATION:   VITAL SIGNS:/73  Pulse 59  Temp 97.4  F (36.3  C) (Tympanic)  Resp 16  Ht 1.778 m (5' 10\")  Wt 79.8 kg (176 lb)  SpO2 98%  BMI 25.25 kg/m2  GENERAL: Sitting comfortably.   HEENT: Pupils are equal. Oropharynx has small area of mucositis.  NECK: No cervical or supraclavicular lymphadenopathy.   LUNGS: Clear bilaterally.   HEART: S1, S2, regular.   ABDOMEN: Soft, nontender, nondistended, no hepatosplenomegaly.   EXTREMITIES: Warm, well perfused.   NEUROLOGIC: Alert, awake.   SKIN: No rash.   LYMPHATICS: No edema.     DATA:   Recent Labs   Lab Test  03/28/18   1158  09/12/17   1105  03/13/17   1026  09/16/16   1045  06/07/16   0753   NA  139  137  138  135  138   POTASSIUM  4.8  4.5  4.6  5.0  5.0   CHLORIDE  110*  107  108  105  108   CO2  21  23  21  24  22   ANIONGAP  8  7  9  6  8   BUN  40*  39*  38*  33*  34*   CR  1.89*  1.79*  1.76*  1.73*  1.61*   GLC  291*  235*  252*  250*  167*   CARSON  9.0  9.7  9.5  8.8  8.7     Recent Labs   Lab Test  09/28/15   0829  09/27/15   1032  09/26/15   0556  09/25/15   0705  09/24/15   0705  09/23/15   0645  09/22/15   0630   MAG  1.8  " 2.0  1.7  1.9  1.8  1.7   --    PHOS  3.3   --    --   2.8  2.8  2.6  4.0     Recent Labs   Lab Test  03/28/18   1158  09/12/17   1105  03/13/17   1026  09/16/16   1045  06/07/16   0753   WBC  6.4  8.2  7.3  6.2  6.5   HGB  12.8*  13.4  14.4  12.5*  12.7*   PLT  136*  191  162  135*  131*   MCV  93  93  95  95  95   NEUTROPHIL  70.2  77.8  72.0  68.9  72.7     Recent Labs   Lab Test  03/28/18   1158  09/12/17   1105  03/13/17   1026   BILITOTAL  0.8  1.1  0.9   ALKPHOS  103  76  57   ALT  27  28  41   AST  14  14  27   ALBUMIN  3.2*  3.5  3.5   LDH  147  183  188     No results found for: TSH  No results for input(s): CEA in the last 32284 hours.  Results for orders placed or performed during the hospital encounter of 03/28/18   CT Chest Abdomen Pelvis w/o Contrast    Narrative    CT CHEST, ABDOMEN AND PELVIS WITHOUT CONTRAST  3/28/2018 12:41 PM    HISTORY:  Restaging. Malignant neoplasm of overlapping sites of  bladder (H).    TECHNIQUE: CT scan obtained of the chest, abdomen, and pelvis without  IV contrast. Radiation dose for this scan was reduced using automated  exposure control, adjustment of the mA and/or kV according to patient  size, or iterative reconstruction technique.    COMPARISON:  CT chest, abdomen, and pelvis 9/12/2017.    FINDINGS:  Chest: There are stable small mediastinal lymph nodes. No new  adenopathy identified within the chest. No effusions. Thoracic aortic  and mild coronary artery calcifications noted. No new worrisome focal  airspace disease. A few tiny pulmonary granulomas appear stable. No  destructive-appearing bony lesions identified within the chest.    Abdomen/pelvis: No destructive-appearing bony lesions identified  within the abdomen or pelvis. Stable well-defined sclerosis at the  right innominate bone that may just be a bone island. Cholecystectomy  and pneumobilia appear stable. Gas in the gallbladder lumen again  noted as well. A few layering gallstones suggested on series 2  image  61. Peripherally calcified vascular structures suggest a  stable-appearing hepatic artery aneurysm. It is 2 x 1.4 cm, image 58.  Other vascular calcifications noted. Cystectomy and right lower  quadrant urinary diversion. Stable large cyst at the right kidney  measuring 8.7 cm, image 75. Stable mild ectasia of the left renal  pelvis. No convincing ureter lesion at unenhanced scanning. There are  areas of renal cortical thinning on the left that appear stable.    No acute bowel abnormality. No obstruction of the bowel. Parastomal  hernia containing a bowel loop is newly identified at the right  abdomen, series 2 image 77. No new enlarged lymph nodes identified.      Impression    IMPRESSION:  1. No convincing evidence for new disease within the limits of unenhanced scanning.  2. 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  3. Pneumobilia again noted. A few small gallstones in the gallbladder lumen suggested.  4. New small parastomal bowel-containing hernia without bowel obstruction.    AKIL WARREN MD     Recent Labs   Lab Test  09/08/15   1330   PSA  <0.01         ASSESSMENT:    1. Muscle invasive bladder cancer - post dose dense MVAC ypT1N0 disease  2. CKD stage III;   3. Prostate cancer s/p xrt, TIA - 2010, A fib on warfarin, DM II    I have reviewed actual images from his recent staging CT scan. All his previous lung lesions are stable. Nothing to suggest recurrent disease based on his CT scans, labs or symptoms    His kidney function remains stable. He does have HTN and DM TII in addition to his CKD.    I explained that we could follow him every 6 months. I have suggested that he get his labs, scans and urine exam - week to 10 days prior to clinic visit if feasible. Urine cytology is negative and FISH suggests inadequate due to cell counts.     Over 25 min of direct face to face time spent with patient with more than 50% time spent in counseling and coordinating care.

## 2018-04-06 NOTE — LETTER
"    4/6/2018         RE: Baldo Landon  [address]   [facility] [address]        Dear Colleague,    Thank you for referring your patient, Baldo Landon, to the [facility]. Please see a copy of my visit note below.    [facility] PHYSICIANS  HEMATOLOGY ONCOLOGY     DIAGNOSES:     1.  Muscle invasive bladder cancer diagnosed 05/11/2015.    2.  History of prostate cancer.      TREATMENT:     1.  TURBT, 05/11/2015.    2.  Cycle 1 neoadjuvant dose dense MVAC, methotrexate, vinblastine, doxorubicin and cyclophosphamide 06/11/2015 with Neulasta support.  Cycle 2 06/24/2015. Cycle 3 7/8/15, Cycle 4 7/22/15.  3.  Cystoprostatectomy - 9/15/15    SUBJECTIVE:  Baldo Landon comes in for a followup today. He is feeling well.    He had a scan done prior to clinic visit and he is here to review findings.     REVIEW OF SYSTEMS:  A complete review of systems was performed and found to be negative other than pertinent positives mentioned in history of present illness.     Past medical, social histories reviewed.    Meds- Reviewed.     PHYSICAL EXAMINATION:   VITAL SIGNS:/73  Pulse 59  Temp 97.4  F (36.3  C) (Tympanic)  Resp 16  Ht 1.778 m (5' 10\")  Wt 79.8 kg (176 lb)  SpO2 98%  BMI 25.25 kg/m2  GENERAL: Sitting comfortably.   HEENT: Pupils are equal. Oropharynx has small area of mucositis.  NECK: No cervical or supraclavicular lymphadenopathy.   LUNGS: Clear bilaterally.   HEART: S1, S2, regular.   ABDOMEN: Soft, nontender, nondistended, no hepatosplenomegaly.   EXTREMITIES: Warm, well perfused.   NEUROLOGIC: Alert, awake.   SKIN: No rash.   LYMPHATICS: No edema.     DATA:   Recent Labs   Lab Test  03/28/18   1158  09/12/17   1105  03/13/17   1026  09/16/16   1045  06/07/16   0753   NA  139  137  138  135  138   POTASSIUM  4.8  4.5  4.6  5.0  5.0   CHLORIDE  110*  107  108  105  108   CO2  21  23  21  24  22   ANIONGAP  8  7  9  6  8   BUN  40*  39*  38*  33*  34*   CR  1.89*  1.79*  " 1.76*  1.73*  1.61*   GLC  291*  235*  252*  250*  167*   CARSON  9.0  9.7  9.5  8.8  8.7     Recent Labs   Lab Test  09/28/15   0829  09/27/15   1032  09/26/15   0556  09/25/15   0705  09/24/15   0705  09/23/15   0645  09/22/15   0630   MAG  1.8  2.0  1.7  1.9  1.8  1.7   --    PHOS  3.3   --    --   2.8  2.8  2.6  4.0     Recent Labs   Lab Test  03/28/18   1158  09/12/17   1105  03/13/17   1026  09/16/16   1045  06/07/16   0753   WBC  6.4  8.2  7.3  6.2  6.5   HGB  12.8*  13.4  14.4  12.5*  12.7*   PLT  136*  191  162  135*  131*   MCV  93  93  95  95  95   NEUTROPHIL  70.2  77.8  72.0  68.9  72.7     Recent Labs   Lab Test  03/28/18   1158  09/12/17   1105  03/13/17   1026   BILITOTAL  0.8  1.1  0.9   ALKPHOS  103  76  57   ALT  27  28  41   AST  14  14  27   ALBUMIN  3.2*  3.5  3.5   LDH  147  183  188     No results found for: TSH  No results for input(s): CEA in the last 48555 hours.  Results for orders placed or performed during the hospital encounter of 03/28/18   CT Chest Abdomen Pelvis w/o Contrast    Narrative    CT CHEST, ABDOMEN AND PELVIS WITHOUT CONTRAST  3/28/2018 12:41 PM    HISTORY:  Restaging. Malignant neoplasm of overlapping sites of  bladder (H).    TECHNIQUE: CT scan obtained of the chest, abdomen, and pelvis without  IV contrast. Radiation dose for this scan was reduced using automated  exposure control, adjustment of the mA and/or kV according to patient  size, or iterative reconstruction technique.    COMPARISON:  CT chest, abdomen, and pelvis 9/12/2017.    FINDINGS:  Chest: There are stable small mediastinal lymph nodes. No new  adenopathy identified within the chest. No effusions. Thoracic aortic  and mild coronary artery calcifications noted. No new worrisome focal  airspace disease. A few tiny pulmonary granulomas appear stable. No  destructive-appearing bony lesions identified within the chest.    Abdomen/pelvis: No destructive-appearing bony lesions identified  within the abdomen or  pelvis. Stable well-defined sclerosis at the  right innominate bone that may just be a bone island. Cholecystectomy  and pneumobilia appear stable. Gas in the gallbladder lumen again  noted as well. A few layering gallstones suggested on series 2 image  61. Peripherally calcified vascular structures suggest a  stable-appearing hepatic artery aneurysm. It is 2 x 1.4 cm, image 58.  Other vascular calcifications noted. Cystectomy and right lower  quadrant urinary diversion. Stable large cyst at the right kidney  measuring 8.7 cm, image 75. Stable mild ectasia of the left renal  pelvis. No convincing ureter lesion at unenhanced scanning. There are  areas of renal cortical thinning on the left that appear stable.    No acute bowel abnormality. No obstruction of the bowel. Parastomal  hernia containing a bowel loop is newly identified at the right  abdomen, series 2 image 77. No new enlarged lymph nodes identified.      Impression    IMPRESSION:  1. No convincing evidence for new disease within the limits of unenhanced scanning.  2. 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  3. Pneumobilia again noted. A few small gallstones in the gallbladder lumen suggested.  4. New small parastomal bowel-containing hernia without bowel obstruction.    AKIL WARREN MD     Recent Labs   Lab Test  09/08/15   1330   PSA  <0.01         ASSESSMENT:    1. Muscle invasive bladder cancer - post dose dense MVAC ypT1N0 disease  2. CKD stage III;   3. Prostate cancer s/p xrt, TIA - 2010, A fib on warfarin, DM II    I have reviewed actual images from his recent staging CT scan. All his previous lung lesions are stable. Nothing to suggest recurrent disease based on his CT scans, labs or symptoms    His kidney function remains stable. He does have HTN and DM TII in addition to his CKD.    I explained that we could follow him every 6 months. I have suggested that he get his labs, scans and urine exam - week to 10 days prior to clinic visit if  feasible. Urine cytology is negative and FISH suggests inadequate due to cell counts.     Over 25 min of direct face to face time spent with patient with more than 50% time spent in counseling and coordinating care.        Again, thank you for allowing me to participate in the care of your patient.        Sincerely,        Bradford Pires MD

## 2018-04-06 NOTE — MR AVS SNAPSHOT
After Visit Summary   4/6/2018    Baldo Landon    MRN: [id number]           Patient Information     Date Of Birth          1940        Visit Information        Provider Department      4/6/2018 11:15 AM Bradford Pires MD [facility]        Today's Diagnoses     Malignant neoplasm of overlapping sites of bladder (H)    -  1      Care Instructions    Follow up in 6 months with Labs and CT scan a week prior to visit with Dr. Betancourt          Follow-ups after your visit        Your next 10 appointments already scheduled     Sep 25, 2018 10:30 AM CDT   Return Visit with RH ONCOLOGY NURSE   [facility] ([facility])    [facility]  [address]  [facility] [id number] [phone]            Sep 25, 2018 11:30 AM CDT   CT CHEST ABDOMEN PELVIS W/O CONTRAST with [id number] [facility] [place] ([facility])    [address]  [facility] [id number] [phone]           Please bring any scans or X-rays taken at other hospitals, if similar tests were done. Also bring a list of your medicines, including vitamins, minerals and over-the-counter drugs. It is safest to leave personal items at home.  Be sure to tell your doctor:   If you have any allergies.   If there s any chance you are pregnant.   If you are breastfeeding.  You may have contrast for this exam. To prepare:   Do not eat or drink for 2 hours before your exam. If you need to take medicine, you may take it with small sips of water. (We may ask you to take liquid medicine as well.)   The day before your exam, drink extra fluids at least six 8-ounce glasses (unless your doctor tells you to restrict your fluids).   You will be given instructions on how to drink the contrast.  Patients over 70 or patients with diabetes or kidney problems:   If you haven t had a blood test (creatinine test) within  the last 30 days, the Cardiologist/Radiologist may require you to get this test prior to your exam.  If you have diabetes:   Continue to take your metformin medication on the day of your exam  Please wear loose clothing, such as a sweat suit or jogging clothes. Avoid snaps, zippers and other metal. We may ask you to undress and put on a hospital gown.  If you have any questions, please call the Imaging Department where you will have your exam.            Sep 25, 2018  1:15 PM CDT   Return Visit with Bradford Pires MD   [facility] ([facility])    [facility]  [address]  [facility] [address] [phone]            Sep 26, 2018  2:00 PM CDT   Return Visit with Ruddy Betancourt MD   [facility] ([facility])    [facility]  [address]  [facility] [address] [phone]              Future tests that were ordered for you today     Open Future Orders        Priority Expected Expires Ordered    CT Chest Abdomen Pelvis w/o Contrast Routine 4/6/2018 11/21/2018 4/6/2018            Who to contact     If you have questions or need follow up information about today's clinic visit or your schedule please contact [facility] directly at [phone].  Normal or non-critical lab and imaging results will be communicated to you by MyChart, letter or phone within 4 business days after the clinic has received the results. If you do not hear from us within 7 days, please contact the clinic through [facility] or phone. If you have a critical or abnormal lab result, we will notify you by phone as soon as possible.  Submit refill requests through [employer] or call your pharmacy and they will forward the refill request to us. Please allow 3 business days for your refill to be completed.          Additional Information About Your Visit        [employer]  "Information     [facility] gives you secure access to your electronic health record. If you see a primary care provider, you can also send messages to your care team and make appointments. If you have questions, please call your primary care clinic.  If you do not have a primary care provider, please call [phone] and they will assist you.        Care EveryWhere ID     This is your Care EveryWhere ID. This could be used by other organizations to access your [place] medical records  [id number]        Your Vitals Were     Pulse Temperature Respirations Height Pulse Oximetry BMI (Body Mass Index)    59 97.4  F (36.3  C) (Tympanic) 16 1.778 m (5' 10\") 98% 25.25 kg/m2       Blood Pressure from Last 3 Encounters:   04/06/18 115/73   03/28/18 107/69   09/19/17 116/72    Weight from Last 3 Encounters:   04/06/18 79.8 kg (176 lb)   03/28/18 78.7 kg (173 lb 9.6 oz)   09/19/17 79.1 kg (174 lb 6.4 oz)               Primary Care Provider Office Phone # Fax #    Luis Monge [phone]       [place] [address]        Equal Access to Services     MAXX ONEAL AH: Hadii aad ku hadasho Soomaali, waaxda luqadaha, qaybta kaalmada adeegyada, waxay idiin hayolin humaira cedillo lacande lundy. So [facility] [phone].    ATENCIÓN: Si habla español, tiene a dudley disposición servicios gratuitos de asistencia lingüística. Llame al [phone].    We comply with applicable federal civil rights laws and [place] laws. We do not discriminate on the basis of race, color, national origin, age, disability, sex, sexual orientation, or gender identity.            Thank you!     Thank you for choosing [facility]  for your care. Our goal is always to provide you with excellent care. Hearing back from our patients is one way we can continue to improve our services. Please take a few minutes to complete the written survey that you may receive in the mail after your visit with " us. Thank you!             Your Updated Medication List - Protect others around you: Learn how to safely use, store and throw away your medicines at www.disposemymeds.org.          This list is accurate as of 4/6/18 12:39 PM.  Always use your most recent med list.                   Brand Name Dispense Instructions for use Diagnosis    DAILY MULTIVITAMIN PO      Take 1 tablet by mouth daily        DIGOXIN PO      Take 125 mcg by mouth daily 1/2 tablet once daily        GLIPIZIDE PO      Take 10 mg by mouth 2 times daily (before meals)        LIPITOR PO      Take 80 mg by mouth daily 1/2 tablet daily    Malignant neoplasm of overlapping sites of bladder (H)       losartan 25 MG tablet    COZAAR     Take 25 mg by mouth daily    Malignant neoplasm of overlapping sites of bladder (H)       metoprolol tartrate 50 MG tablet    LOPRESSOR     Take 75 mg by mouth        order for DME     1 Month    Equipment being ordered: Urostomy supplies    Bladder cancer (H)       saxagliptin 2.5 MG Tabs tablet    ONGLYZA     Take by mouth daily    Malignant neoplasm of overlapping sites of bladder (H)       warfarin 5 MG tablet    COUMADIN    30 tablet    Take 5 mg by mouth daily Except 1/2 pill on Monday    Chronic atrial fibrillation (H)

## 2018-09-25 ENCOUNTER — ONCOLOGY VISIT (OUTPATIENT)
Dept: ONCOLOGY | Facility: CLINIC | Age: 78
End: 2018-09-25
Attending: INTERNAL MEDICINE
Payer: MEDICARE

## 2018-09-25 ENCOUNTER — HOSPITAL ENCOUNTER (OUTPATIENT)
Dept: CT IMAGING | Facility: CLINIC | Age: 78
Discharge: HOME | End: 2018-09-25
Attending: INTERNAL MEDICINE | Admitting: INTERNAL MEDICINE
Payer: MEDICARE

## 2018-09-25 ENCOUNTER — HOSPITAL ENCOUNTER (OUTPATIENT)
Facility: CLINIC | Age: 78
Setting detail: SPECIMEN
Discharge: HOME | End: 2018-09-25
Attending: INTERNAL MEDICINE | Admitting: INTERNAL MEDICINE
Payer: MEDICARE

## 2018-09-25 VITALS
OXYGEN SATURATION: 98 % | WEIGHT: 176 LBS | RESPIRATION RATE: 16 BRPM | HEART RATE: 70 BPM | TEMPERATURE: 97.6 F | BODY MASS INDEX: 25.2 KG/M2 | DIASTOLIC BLOOD PRESSURE: 60 MMHG | HEIGHT: 70 IN | SYSTOLIC BLOOD PRESSURE: 99 MMHG

## 2018-09-25 DIAGNOSIS — C67.8 MALIGNANT NEOPLASM OF OVERLAPPING SITES OF BLADDER (H): Primary | ICD-10-CM

## 2018-09-25 DIAGNOSIS — C67.8 MALIGNANT NEOPLASM OF OVERLAPPING SITES OF BLADDER (H): ICD-10-CM

## 2018-09-25 LAB
ALBUMIN SERPL-MCNC: 3.5 G/DL (ref 3.4–5)
ALP SERPL-CCNC: 111 U/L (ref 40–150)
ALT SERPL W P-5'-P-CCNC: 30 U/L (ref 0–70)
ANION GAP SERPL CALCULATED.3IONS-SCNC: 8 MMOL/L (ref 3–14)
AST SERPL W P-5'-P-CCNC: 18 U/L (ref 0–45)
BASOPHILS # BLD AUTO: 0 10E9/L (ref 0–0.2)
BASOPHILS NFR BLD AUTO: 0.5 %
BILIRUB SERPL-MCNC: 1.3 MG/DL (ref 0.2–1.3)
BUN SERPL-MCNC: 42 MG/DL (ref 7–30)
CALCIUM SERPL-MCNC: 8.3 MG/DL (ref 8.5–10.1)
CHLORIDE SERPL-SCNC: 110 MMOL/L (ref 94–109)
CO2 SERPL-SCNC: 20 MMOL/L (ref 20–32)
CREAT SERPL-MCNC: 2.11 MG/DL (ref 0.66–1.25)
DIFFERENTIAL METHOD BLD: ABNORMAL
EOSINOPHIL # BLD AUTO: 0.1 10E9/L (ref 0–0.7)
EOSINOPHIL NFR BLD AUTO: 1.3 %
ERYTHROCYTE [DISTWIDTH] IN BLOOD BY AUTOMATED COUNT: 13.5 % (ref 10–15)
GFR SERPL CREATININE-BSD FRML MDRD: 31 ML/MIN/1.7M2
GLUCOSE SERPL-MCNC: 209 MG/DL (ref 70–99)
HCT VFR BLD AUTO: 40.5 % (ref 40–53)
HGB BLD-MCNC: 13.4 G/DL (ref 13.3–17.7)
IMM GRANULOCYTES # BLD: 0 10E9/L (ref 0–0.4)
IMM GRANULOCYTES NFR BLD: 0.5 %
LDH SERPL L TO P-CCNC: 159 U/L (ref 85–227)
LYMPHOCYTES # BLD AUTO: 0.8 10E9/L (ref 0.8–5.3)
LYMPHOCYTES NFR BLD AUTO: 13.4 %
MCH RBC QN AUTO: 31.5 PG (ref 26.5–33)
MCHC RBC AUTO-ENTMCNC: 33.1 G/DL (ref 31.5–36.5)
MCV RBC AUTO: 95 FL (ref 78–100)
MONOCYTES # BLD AUTO: 0.5 10E9/L (ref 0–1.3)
MONOCYTES NFR BLD AUTO: 7.9 %
NEUTROPHILS # BLD AUTO: 4.6 10E9/L (ref 1.6–8.3)
NEUTROPHILS NFR BLD AUTO: 76.4 %
NRBC # BLD AUTO: 0 10*3/UL
NRBC BLD AUTO-RTO: 0 /100
PLATELET # BLD AUTO: 125 10E9/L (ref 150–450)
POTASSIUM SERPL-SCNC: 4.3 MMOL/L (ref 3.4–5.3)
PROT SERPL-MCNC: 6.8 G/DL (ref 6.8–8.8)
RBC # BLD AUTO: 4.26 10E12/L (ref 4.4–5.9)
SODIUM SERPL-SCNC: 138 MMOL/L (ref 133–144)
WBC # BLD AUTO: 6.1 10E9/L (ref 4–11)

## 2018-09-25 PROCEDURE — 83615 LACTATE (LD) (LDH) ENZYME: CPT | Performed by: INTERNAL MEDICINE

## 2018-09-25 PROCEDURE — 88112 CYTOPATH CELL ENHANCE TECH: CPT | Performed by: INTERNAL MEDICINE

## 2018-09-25 PROCEDURE — 99214 OFFICE O/P EST MOD 30 MIN: CPT | Performed by: INTERNAL MEDICINE

## 2018-09-25 PROCEDURE — G0463 HOSPITAL OUTPT CLINIC VISIT: HCPCS

## 2018-09-25 PROCEDURE — 85025 COMPLETE CBC W/AUTO DIFF WBC: CPT | Performed by: INTERNAL MEDICINE

## 2018-09-25 PROCEDURE — 74176 CT ABD & PELVIS W/O CONTRAST: CPT

## 2018-09-25 PROCEDURE — 00000103 ZZHCL STATISTIC CYTO-FISH QC 88120: Performed by: INTERNAL MEDICINE

## 2018-09-25 PROCEDURE — 80053 COMPREHEN METABOLIC PANEL: CPT | Performed by: INTERNAL MEDICINE

## 2018-09-25 PROCEDURE — 88112 CYTOPATH CELL ENHANCE TECH: CPT | Mod: 26 | Performed by: INTERNAL MEDICINE

## 2018-09-25 PROCEDURE — 36415 COLL VENOUS BLD VENIPUNCTURE: CPT

## 2018-09-25 ASSESSMENT — PAIN SCALES - GENERAL: PAINLEVEL: NO PAIN (0)

## 2018-09-25 NOTE — NURSING NOTE
"Oncology Rooming Note    September 25, 2018 1:45 PM   Baldo Landon is a 77 year old male who presents for:    Chief Complaint   Patient presents with     Oncology Clinic Visit     Bladder cancer      Initial Vitals: BP 99/60  Pulse 70  Temp 97.6  F (36.4  C) (Oral)  Resp 16  Ht 1.778 m (5' 10\")  Wt 79.8 kg (176 lb)  SpO2 98%  BMI 25.25 kg/m2 Estimated body mass index is 25.25 kg/(m^2) as calculated from the following:    Height as of this encounter: 1.778 m (5' 10\").    Weight as of this encounter: 79.8 kg (176 lb). Body surface area is 1.99 meters squared.  No Pain (0) Comment: Data Unavailable   No LMP for male patient.  Allergies reviewed: Yes  Medications reviewed: Yes    Medications: Medication refills not needed today.  Pharmacy name entered into EPIC:    [place], [place] - [id number] [place] [address] [facility] - [place], [place] - [address] [id number], [place] - [address]    Clinical concerns: f/u     8 minutes for nursing intake (face to face time)     Ese Floyd CMA            DISCHARGE PLAN:  Next appointments: See patient instruction section  Departure Mode: Ambulatory  Accompanied by: wife  0 minutes for nursing discharge (face to face time)   Ese Floyd CMA      "

## 2018-09-25 NOTE — MR AVS SNAPSHOT
After Visit Summary   9/25/2018    Baldo Landon    MRN: [id number]           Patient Information     Date Of Birth          1940        Visit Information        Provider Department      9/25/2018 1:15 PM Bradford Pires MD [facility]        Today's Diagnoses     Malignant neoplasm of overlapping sites of bladder (H)    -  1      Care Instructions    Follow up in 6 months with Labs and CT scan a week prior to visit with me scheduled terra           Follow-ups after your visit        Your next 10 appointments already scheduled     Oct 10, 2018  9:00 AM CDT   Return Visit with Ruddy Betancourt MD   [facility] ([facility])    [facility]  [address]  [facility] [address]   [phone]            Apr 02, 2019 12:30 PM CDT   LAB with  LAB DRAW 1   [facility] ([facility])    [facility]  [address]  [facility] [address]   [phone]           Please do not eat 10-12 hours before your appointment if you are coming in fasting for labs on lipids, cholesterol, or glucose (sugar). This does not apply to pregnant women. Water, hot tea and black coffee (with nothing added) are okay. Do not drink other fluids, diet soda or chew gum.            Apr 02, 2019  1:00 PM CDT   CT CHEST/ABDOMEN/PELVIS W CONTRAST with [id number]   [facility] ([facility])    [address]  [facility] [address]   [phone]           How do I prepare for my exam? (Food and drink instructions) To prepare: Do not eat or drink for 2 hours before your exam. If you need to take medicine, you may take it with small sips of water. (We may ask you to take liquid medicine as well.)  How do I prepare for my exam? (Other instructions) Please arrive 30 minutes early for your CT.   Once in the department you might be asked to drink water 15-20 minutes prior to your exam.  If indicated you may be asked to drink an oral contrast in advance of your CT.  If this is the case, the imaging team will let you know or be in contact with you prior to your appointment  Patients over 70 or patients with diabetes or kidney problems: If you haven t had a blood test (creatinine test) within the last 30 days, the Cardiologist/Radiologist may require you to get this test prior to your exam.  If you have diabetes:  Continue to take your metformin medication on the day of your exam  What should I wear: Please wear loose clothing, such as a sweat suit or jogging clothes. Avoid snaps, zippers and other metal. We may ask you to undress and put on a hospital gown.  How long does the exam take: Most scans take less than 20 minutes.  What should I bring: Please bring any scans or X-rays taken at other hospitals, if similar tests were done. Also bring a list of your medicines, including vitamins, minerals and over-the-counter drugs. It is safest to leave personal items at home.  Do I need a : No  is needed.  What do I need to tell my doctor? Be sure to tell your doctor: * If you have any allergies. * If there s any chance you are pregnant. * If you are breastfeeding.  What should I do after the exam: No restrictions, You may resume normal activities.  What is this test: A CT (computed tomography) scan is a series of pictures that allows us to look inside your body. The scanner creates images of the body in cross sections, much like slices of bread. This helps us see any problems more clearly. You may receive contrast (X-ray dye) before or during your scan. You will be asked to drink the contrast.  Who should I call with questions: If you have any questions, please call the Imaging Department where you will have your exam. Directions, parking instructions, and other information is available on our website,  [email or web address].            Apr 02, 2019  1:45 PM CDT   Return Visit with Bradford Pires MD   [facility] ([facility])    [facility]  [address] [place] [address]  [place] [id number] [phone]              Future tests that were ordered for you today     Open Future Orders        Priority Expected Expires Ordered    CT Chest/Abdomen/Pelvis w Contrast Routine  9/25/2019 9/25/2018            Who to contact     If you have questions or need follow up information about today's clinic visit or your schedule please contact [facility] directly at [phone].  Normal or non-critical lab and imaging results will be communicated to you by [employer], letter or phone within 4 business days after the clinic has received the results. If you do not hear from us within 7 days, please contact the clinic through [employer] or phone. If you have a critical or abnormal lab result, we will notify you by phone as soon as possible.  Submit refill requests through [employer] or call your pharmacy and they will forward the refill request to us. Please allow 3 business days for your refill to be completed.          Additional Information About Your Visit        [employer] Information     [employer] gives you secure access to your electronic health record. If you see a primary care provider, you can also send messages to your care team and make appointments. If you have questions, please call your primary care clinic.  If you do not have a primary care provider, please call [phone] and they will assist you.        Care EveryWhere ID     This is your Care EveryWhere ID. This could be used by other organizations to access your [facility] medical records  [id number]        Your Vitals Were     Pulse Temperature Respirations Height Pulse Oximetry BMI (Body Mass Index)    70 97.6  F (36.4  C) (Oral) 16 1.778 m (5' 10\") 98% 25.25 kg/m2       Blood " Pressure from Last 3 Encounters:   09/25/18 99/60   04/06/18 115/73   03/28/18 107/69    Weight from Last 3 Encounters:   09/25/18 79.8 kg (176 lb)   04/06/18 79.8 kg (176 lb)   03/28/18 78.7 kg (173 lb 9.6 oz)               Primary Care Provider Office Phone # Fax #    Luis Monge [phone]       [address] [address]        Equal Access to Services     [facility] : Hadii nathanael montesinos hadasho Soomaali, waaxda luqadaha, qaybta kaalmada yuridia, didier lundy. So [facility] [phone].    ATENCIÓN: Si habla español, tiene a dudley disposición servicios gratuitos de asistencia lingüística. [facility] [phone].    We comply with applicable federal civil rights laws and [place] laws. We do not discriminate on the basis of race, color, national origin, age, disability, sex, sexual orientation, or gender identity.            Thank you!     Thank you for choosing [facility]  for your care. Our goal is always to provide you with excellent care. Hearing back from our patients is one way we can continue to improve our services. Please take a few minutes to complete the written survey that you may receive in the mail after your visit with us. Thank you!             Your Updated Medication List - Protect others around you: Learn how to safely use, store and throw away your medicines at www.disposemymeds.org.          This list is accurate as of 9/25/18  2:19 PM.  Always use your most recent med list.                   Brand Name Dispense Instructions for use Diagnosis    DAILY MULTIVITAMIN PO      Take 1 tablet by mouth daily        DIGOXIN PO      Take 125 mcg by mouth daily 1/2 tablet once daily        GLIPIZIDE PO      Take 10 mg by mouth 2 times daily (before meals)        LIPITOR PO      Take 80 mg by mouth daily 1/2 tablet daily    Malignant neoplasm of overlapping sites of bladder (H)       losartan 25 MG tablet     COZAAR     Take 25 mg by mouth daily    Malignant neoplasm of overlapping sites of bladder (H)       metoprolol tartrate 50 MG tablet    LOPRESSOR     Take 75 mg by mouth        order for DME     1 Month    Equipment being ordered: Urostomy supplies    Bladder cancer (H)       saxagliptin 2.5 MG Tabs tablet    ONGLYZA     Take by mouth daily    Malignant neoplasm of overlapping sites of bladder (H)       warfarin 5 MG tablet    COUMADIN    30 tablet    Take 5 mg by mouth daily 1/2 pill everyday except on Monday    Chronic atrial fibrillation (H)

## 2018-09-25 NOTE — PROGRESS NOTES
"[facility]  HEMATOLOGY ONCOLOGY     DIAGNOSES:     1.  Muscle invasive bladder cancer diagnosed 05/11/2015.    2.  History of prostate cancer.      TREATMENT:     1.  TURBT, 05/11/2015.    2.  Cycle 1 neoadjuvant dose dense MVAC, methotrexate, vinblastine, doxorubicin and cyclophosphamide 06/11/2015 with Neulasta support.  Cycle 2 06/24/2015. Cycle 3 7/8/15, Cycle 4 7/22/15.  3.  Cystoprostatectomy - 9/15/15    SUBJECTIVE:  Baldo Landon comes in for a followup today. He is feeling well.    He had a scan done prior to clinic visit and he is here to review findings.     REVIEW OF SYSTEMS:  A complete review of systems was performed and found to be negative other than pertinent positives mentioned in history of present illness.     Past medical, social histories reviewed.    Meds- Reviewed.     PHYSICAL EXAMINATION:   VITAL SIGNS:BP 99/60  Pulse 70  Temp 97.6  F (36.4  C) (Oral)  Resp 16  Ht 1.778 m (5' 10\")  Wt 79.8 kg (176 lb)  SpO2 98%  BMI 25.25 kg/m2  GENERAL: Sitting comfortably.   HEENT: Pupils are equal. Oropharynx has small area of mucositis.  NECK: No cervical or supraclavicular lymphadenopathy.   LUNGS: Clear bilaterally.   HEART: S1, S2, regular.   ABDOMEN: Soft, nontender, nondistended, no hepatosplenomegaly.   EXTREMITIES: Warm, well perfused.   NEUROLOGIC: Alert, awake.   SKIN: No rash.   LYMPHATICS: No edema.     DATA:   Recent Labs   Lab Test  09/25/18   1018  03/28/18   1158  09/12/17   1105  03/13/17   1026  09/16/16   1045   NA  138  139  137  138  135   POTASSIUM  4.3  4.8  4.5  4.6  5.0   CHLORIDE  110*  110*  107  108  105   CO2  20  21  23  21  24   ANIONGAP  8  8  7  9  6   BUN  42*  40*  39*  38*  33*   CR  2.11*  1.89*  1.79*  1.76*  1.73*   GLC  209*  291*  235*  252*  250*   CARSON  8.3*  9.0  9.7  9.5  8.8     Recent Labs   Lab Test  09/28/15   0829  09/27/15   1032  09/26/15   0556  09/25/15   0705  09/24/15   0705  09/23/15   0645  09/22/15   0630   MAG  1.8  " 2.0  1.7  1.9  1.8  1.7   --    PHOS  3.3   --    --   2.8  2.8  2.6  4.0     Recent Labs   Lab Test  09/25/18   1018  03/28/18   1158  09/12/17   1105  03/13/17   1026  09/16/16   1045   WBC  6.1  6.4  8.2  7.3  6.2   HGB  13.4  12.8*  13.4  14.4  12.5*   PLT  125*  136*  191  162  135*   MCV  95  93  93  95  95   NEUTROPHIL  76.4  70.2  77.8  72.0  68.9     Recent Labs   Lab Test  09/25/18   1018  03/28/18   1158  09/12/17   1105   BILITOTAL  1.3  0.8  1.1   ALKPHOS  111  103  76   ALT  30  27  28   AST  18  14  14   ALBUMIN  3.5  3.2*  3.5   LDH  159  147  183     No results found for: TSH  No results for input(s): CEA in the last 47010 hours.  Results for orders placed or performed during the hospital encounter of 09/25/18   CT Chest Abdomen Pelvis w/o Contrast    Narrative    CT CHEST, ABDOMEN AND PELVIS WITHOUT CONTRAST  9/25/2018 10:50 AM     HISTORY:  Surveillance malignant neoplasm of overlapping sites of bladder (H).    COMPARISON: CT chest, abdomen and pelvis 3/28/2018.    TECHNIQUE: Axial images from the thoracic inlet to the symphysis are  performed with additional coronal reformatted images. No contrast is  utilized.  Radiation dose for this scan was reduced using automated  exposure control, adjustment of the mA and/or kV according to patient  size, or iterative reconstruction technique.    FINDINGS:   Chest: Area of probable nodular scarring along the right minor fissure  along the mediastinal margin is stable. Calcified granuloma right  middle lobe on series 6, image 211 is unchanged. Tiny left lower lobe  calcified granuloma is noted on image 186. A few tiny 1 to 2 mm left  major fissure nodules are likely small fissural lymph nodes or  possibly small adjacent calcified granulomas. No interval change. No  infiltrate or consolidation. No pleural or pericardial fluid. Heart is  normal in size. Esophagus is unremarkable. No enlarged lymph nodes.  Aorta demonstrates extensive calcified plaque without  aneurysm.  Coronary artery calcifications are also noted.    Abdomen: Pneumobilia is unchanged, likely from prior sphincterotomy  procedure. Air is also noted in the gallbladder which is stable. No  calcified gallstones. The liver, spleen, pancreas and adrenal glands  are within normal limits. Previously described hepatic artery aneurysm  measures 2.1 x 1.6 cm, previously 2.0 x 1.4 cm, minimally changed. No  enlarged lymph nodes. The bowel is normal in caliber without  obstruction. Possible constipation. Appendix not visualized. No  diverticulitis. Areas of left renal cortical scarring are again noted.  Mild left renal pelvis dilatation is noted. No right hydronephrosis.  Large cyst right kidney is unchanged. Tiny nonobstructing left renal  collecting system stone is noted on series 2, image 73 measuring only  0.2 cm.    Pelvis: Patient is status post cystoprostatectomy. No recurrent pelvic  mass is appreciated. Right lower quadrant ileal loop with peristomal  hernia is again noted. The peristomal hernia has increased in size  since the prior study. No evidence of obstruction or incarceration.  Ileal loop does not appear significantly distended at this time. No  evidence of bowel obstruction. No diverticulitis. Appendix not  visualized. The rectum is unremarkable. No enlarged pelvic or inguinal  lymph nodes. Fat-containing right inguinal hernia is again noted.  Multiple metallic seeds are noted in the previous area of the prostate  gland and appear unchanged in position. Aorta is calcified along with  multiple branch vessels. Degenerative spine and hip changes are noted.  Probable bone island in the posterior right iliac bone on image 91,  series 2 which is stable. No aggressive-appearing bone lesions are  evident.      Impression    IMPRESSION:  1. No evidence of recurrent or metastatic disease in the chest,  abdomen or pelvis. A few calcified granulomas and probable fissural  lymph nodes are stable in the  lungs.  2. Stable pneumobilia and hepatic artery aneurysm.  3. Right renal cyst and left renal cortical scarring is unchanged.  Tiny nonobstructing left renal collecting system stone is also stable.  4. Postop changes from cystoprostatectomy and ileal loop diversion.  Slight increased size of the peristomal hernia is noted. No bowel  obstruction or incarceration.  5. Extensive arterial vascular calcification with stable-appearing  hepatic artery aneurysm.    JACQUELYN SHARMA MD         ASSESSMENT:    1. Muscle invasive bladder cancer - post dose dense MVAC ypT1N0 disease  2. CKD stage III;   3. Prostate cancer s/p xrt, TIA - 2010, A fib on warfarin, DM II    I have reviewed actual images from his recent staging CT scan. All his previous lung lesions are stable. Nothing to suggest recurrent disease based on his CT scans, labs or symptoms    His kidney function remains stable though his creatinine is higher than in the past. He does have HTN and DM TII in addition to his CKD.    I explained that we could continue to follow him every 6 months. I have suggested that he get his labs, scans and urine exam - week to 10 days prior to clinic visit if feasible. Urine cytology is negative and FISH suggests inadequate due to cell counts.     Over 25 min of direct face to face time spent with patient with more than 50% time spent in counseling and coordinating care.

## 2018-09-25 NOTE — LETTER
"    9/25/2018         RE: Baldo Landon  [address]  [facility] [address]        Dear Colleague,    Thank you for referring your patient, Baldo Landon, to the [facility]. Please see a copy of my visit note below.    [facility]  HEMATOLOGY ONCOLOGY     DIAGNOSES:     1.  Muscle invasive bladder cancer diagnosed 05/11/2015.    2.  History of prostate cancer.      TREATMENT:     1.  TURBT, 05/11/2015.    2.  Cycle 1 neoadjuvant dose dense MVAC, methotrexate, vinblastine, doxorubicin and cyclophosphamide 06/11/2015 with Neulasta support.  Cycle 2 06/24/2015. Cycle 3 7/8/15, Cycle 4 7/22/15.  3.  Cystoprostatectomy - 9/15/15    SUBJECTIVE:  Baldo Landon comes in for a followup today. He is feeling well.    He had a scan done prior to clinic visit and he is here to review findings.     REVIEW OF SYSTEMS:  A complete review of systems was performed and found to be negative other than pertinent positives mentioned in history of present illness.     Past medical, social histories reviewed.    Meds- Reviewed.     PHYSICAL EXAMINATION:   VITAL SIGNS:BP 99/60  Pulse 70  Temp 97.6  F (36.4  C) (Oral)  Resp 16  Ht 1.778 m (5' 10\")  Wt 79.8 kg (176 lb)  SpO2 98%  BMI 25.25 kg/m2  GENERAL: Sitting comfortably.   HEENT: Pupils are equal. Oropharynx has small area of mucositis.  NECK: No cervical or supraclavicular lymphadenopathy.   LUNGS: Clear bilaterally.   HEART: S1, S2, regular.   ABDOMEN: Soft, nontender, nondistended, no hepatosplenomegaly.   EXTREMITIES: Warm, well perfused.   NEUROLOGIC: Alert, awake.   SKIN: No rash.   LYMPHATICS: No edema.     DATA:   Recent Labs   Lab Test  09/25/18   1018  03/28/18   1158  09/12/17   1105  03/13/17   1026  09/16/16   1045   NA  138  139  137  138  135   POTASSIUM  4.3  4.8  4.5  4.6  5.0   CHLORIDE  110*  110*  107  108  105   CO2  20  21  23  21  24   ANIONGAP  8  8  7  9  6   BUN  42*  40*  39*  38*  33*   CR  2.11*  1.89*  1.79*  " 1.76*  1.73*   GLC  209*  291*  235*  252*  250*   CARSON  8.3*  9.0  9.7  9.5  8.8     Recent Labs   Lab Test  09/28/15   0829  09/27/15   1032  09/26/15   0556  09/25/15   0705  09/24/15   0705  09/23/15   0645  09/22/15   0630   MAG  1.8  2.0  1.7  1.9  1.8  1.7   --    PHOS  3.3   --    --   2.8  2.8  2.6  4.0     Recent Labs   Lab Test  09/25/18   1018  03/28/18   1158  09/12/17   1105  03/13/17   1026  09/16/16   1045   WBC  6.1  6.4  8.2  7.3  6.2   HGB  13.4  12.8*  13.4  14.4  12.5*   PLT  125*  136*  191  162  135*   MCV  95  93  93  95  95   NEUTROPHIL  76.4  70.2  77.8  72.0  68.9     Recent Labs   Lab Test  09/25/18   1018  03/28/18   1158  09/12/17   1105   BILITOTAL  1.3  0.8  1.1   ALKPHOS  111  103  76   ALT  30  27  28   AST  18  14  14   ALBUMIN  3.5  3.2*  3.5   LDH  159  147  183     No results found for: TSH  No results for input(s): CEA in the last 14008 hours.  Results for orders placed or performed during the hospital encounter of 09/25/18   CT Chest Abdomen Pelvis w/o Contrast    Narrative    CT CHEST, ABDOMEN AND PELVIS WITHOUT CONTRAST  9/25/2018 10:50 AM     HISTORY:  Surveillance malignant neoplasm of overlapping sites of bladder (H).    COMPARISON: CT chest, abdomen and pelvis 3/28/2018.    TECHNIQUE: Axial images from the thoracic inlet to the symphysis are  performed with additional coronal reformatted images. No contrast is  utilized.  Radiation dose for this scan was reduced using automated  exposure control, adjustment of the mA and/or kV according to patient  size, or iterative reconstruction technique.    FINDINGS:   Chest: Area of probable nodular scarring along the right minor fissure  along the mediastinal margin is stable. Calcified granuloma right  middle lobe on series 6, image 211 is unchanged. Tiny left lower lobe  calcified granuloma is noted on image 186. A few tiny 1 to 2 mm left  major fissure nodules are likely small fissural lymph nodes or  possibly small adjacent  calcified granulomas. No interval change. No  infiltrate or consolidation. No pleural or pericardial fluid. Heart is  normal in size. Esophagus is unremarkable. No enlarged lymph nodes.  Aorta demonstrates extensive calcified plaque without aneurysm.  Coronary artery calcifications are also noted.    Abdomen: Pneumobilia is unchanged, likely from prior sphincterotomy  procedure. Air is also noted in the gallbladder which is stable. No  calcified gallstones. The liver, spleen, pancreas and adrenal glands  are within normal limits. Previously described hepatic artery aneurysm  measures 2.1 x 1.6 cm, previously 2.0 x 1.4 cm, minimally changed. No  enlarged lymph nodes. The bowel is normal in caliber without  obstruction. Possible constipation. Appendix not visualized. No  diverticulitis. Areas of left renal cortical scarring are again noted.  Mild left renal pelvis dilatation is noted. No right hydronephrosis.  Large cyst right kidney is unchanged. Tiny nonobstructing left renal  collecting system stone is noted on series 2, image 73 measuring only  0.2 cm.    Pelvis: Patient is status post cystoprostatectomy. No recurrent pelvic  mass is appreciated. Right lower quadrant ileal loop with peristomal  hernia is again noted. The peristomal hernia has increased in size  since the prior study. No evidence of obstruction or incarceration.  Ileal loop does not appear significantly distended at this time. No  evidence of bowel obstruction. No diverticulitis. Appendix not  visualized. The rectum is unremarkable. No enlarged pelvic or inguinal  lymph nodes. Fat-containing right inguinal hernia is again noted.  Multiple metallic seeds are noted in the previous area of the prostate  gland and appear unchanged in position. Aorta is calcified along with  multiple branch vessels. Degenerative spine and hip changes are noted.  Probable bone island in the posterior right iliac bone on image 91,  series 2 which is stable. No  aggressive-appearing bone lesions are  evident.      Impression    IMPRESSION:  1. No evidence of recurrent or metastatic disease in the chest,  abdomen or pelvis. A few calcified granulomas and probable fissural  lymph nodes are stable in the lungs.  2. Stable pneumobilia and hepatic artery aneurysm.  3. Right renal cyst and left renal cortical scarring is unchanged.  Tiny nonobstructing left renal collecting system stone is also stable.  4. Postop changes from cystoprostatectomy and ileal loop diversion.  Slight increased size of the peristomal hernia is noted. No bowel  obstruction or incarceration.  5. Extensive arterial vascular calcification with stable-appearing  hepatic artery aneurysm.    JACQUELYN SHARMA MD         ASSESSMENT:    1. Muscle invasive bladder cancer - post dose dense MVAC ypT1N0 disease  2. CKD stage III;   3. Prostate cancer s/p xrt, TIA - 2010, A fib on warfarin, DM II    I have reviewed actual images from his recent staging CT scan. All his previous lung lesions are stable. Nothing to suggest recurrent disease based on his CT scans, labs or symptoms    His kidney function remains stable though his creatinine is higher than in the past. He does have HTN and DM TII in addition to his CKD.    I explained that we could continue to follow him every 6 months. I have suggested that he get his labs, scans and urine exam - week to 10 days prior to clinic visit if feasible. Urine cytology is negative and FISH suggests inadequate due to cell counts.     Over 25 min of direct face to face time spent with patient with more than 50% time spent in counseling and coordinating care.        Again, thank you for allowing me to participate in the care of your patient.        Sincerely,        Bradford Pires MD

## 2018-09-25 NOTE — LETTER
9/25/2018         RE: Baldo Landon   [address]  [facility] [address]        Dear Colleague,    Thank you for referring your patient, Baldo Landon, to the [facility]. Please see a copy of my visit note below.    See Nursing Notes    Again, thank you for allowing me to participate in the care of your patient.        Sincerely,        [facility] Oncology Nurse

## 2018-09-25 NOTE — NURSING NOTE
Medical Assistant Note:  Baldo Landon presents today for Blood Draw.    Patient seen by provider today: No.   present during visit today: Not Applicable.    Concerns: No Concerns.    Procedure:  Lab draw site: left arm, Needle type: butterfly, Gauge: 21.    Post Assessment:  Labs drawn without difficulty: Yes.    Discharge Plan:  Departure Mode: Ambulatory.    Face to Face Time: 10.    [facility]

## 2018-09-25 NOTE — MR AVS SNAPSHOT
After Visit Summary   9/25/2018    Baldo Landon    MRN: [id number]           Patient Information     Date Of Birth          1940        Visit Information        Provider Department      9/25/2018 10:30 AM RH ONCOLOGY NURSE [facility]        Today's Diagnoses     Malignant neoplasm of overlapping sites of bladder (H)           Follow-ups after your visit        Your next 10 appointments already scheduled     Sep 25, 2018 10:30 AM CDT   Return Visit with RH ONCOLOGY NURSE   [facility] ([facility])    [facility]  [address]  [facility] [address] [phone]            Sep 25, 2018 11:30 AM CDT   CT CHEST ABDOMEN PELVIS W/O CONTRAST with [id number] [facility] ([facility])    [address]  [facility] [id number] [phone]           How do I prepare for my exam? (Food and drink instructions) To prepare: Do not eat or drink for 2 hours before your exam. If you need to take medicine, you may take it with small sips of water. (We may ask you to take liquid medicine as well.)  How do I prepare for my exam? (Other instructions) Please arrive 30 minutes early for your CT.  Once in the department you might be asked to drink water 15-20 minutes prior to your exam.  If indicated you may be asked to drink an oral contrast in advance of your CT.  If this is the case, the imaging team will let you know or be in contact with you prior to your appointment  Patients over 70 or patients with diabetes or kidney problems: If you haven t had a blood test (creatinine test) within the last 30 days, the Cardiologist/Radiologist may require you to get this test prior to your exam.  If you have diabetes:  Continue to take your metformin medication on the day of your exam  What should I wear: Please wear loose clothing, such as a sweat suit or jogging  clothes. Avoid snaps, zippers and other metal. We may ask you to undress and put on a hospital gown.  How long does the exam take: Most scans take less than 20 minutes.  What should I bring: Please bring any scans or X-rays taken at other hospitals, if similar tests were done. Also bring a list of your medicines, including vitamins, minerals and over-the-counter drugs. It is safest to leave personal items at home.  Do I need a : No  is needed.  What do I need to tell my doctor? Be sure to tell your doctor: * If you have any allergies. * If there s any chance you are pregnant. * If you are breastfeeding.  What should I do after the exam: No restrictions, You may resume normal activities.  What is this test: A CT (computed tomography) scan is a series of pictures that allows us to look inside your body. The scanner creates images of the body in cross sections, much like slices of bread. This helps us see any problems more clearly. You may receive contrast (X-ray dye) before or during your scan. You will be asked to drink the contrast.  Who should I call with questions: If you have any questions, please call the Imaging Department where you will have your exam. Directions, parking instructions, and other information is available on our website, [email or web address].            Sep 25, 2018  1:15 PM CDT   Return Visit with Bradford Pires MD   [facility] ([facility])    [facility]  [address]  [facility] [address]   [phone]            Oct 10, 2018  9:00 AM CDT   Return Visit with Ruddy Betancourt MD   [facility] ([facility])    [facility]  [address]  [facility] [address]   [phone]              Who to contact     If you have questions or need follow up information about today's clinic visit or your schedule please contact  [facility] directly at [phone].  Normal or non-critical lab and imaging results will be communicated to you by MyChart, letter or phone within 4 business days after the clinic has received the results. If you do not hear from us within 7 days, please contact the clinic through Echo360hart or phone. If you have a critical or abnormal lab result, we will notify you by phone as soon as possible.  Submit refill requests through Jebbit or call your pharmacy and they will forward the refill request to us. Please allow 3 business days for your refill to be completed.          Additional Information About Your Visit        Echo360harClipik Information     Jebbit gives you secure access to your electronic health record. If you see a primary care provider, you can also send messages to your care team and make appointments. If you have questions, please call your primary care clinic.  If you do not have a primary care provider, please call [phone] and they will assist you.        Care EveryWhere ID     This is your Care EveryWhere ID. This could be used by other organizations to access your [place] medical records  [id number]         Blood Pressure from Last 3 Encounters:   04/06/18 115/73   03/28/18 107/69   09/19/17 116/72    Weight from Last 3 Encounters:   04/06/18 79.8 kg (176 lb)   03/28/18 78.7 kg (173 lb 9.6 oz)   09/19/17 79.1 kg (174 lb 6.4 oz)              We Performed the Following     CBC with platelets differential     Comprehensive metabolic panel     Cytology non gyn     Lactate Dehydrogenase        Primary Care Provider Office Phone # Fax #    Luis Monge [phone]       [address] [address]        Equal Access to Services     MAXX ONEAL : Latosha Singh, sukhdeep booth, didier vallejo. So [facility] [phone].    ATENCIÓN: juan miguel Gamino  disposición servicios gratuitos de asistencia lingüística. Giovanni ruiz [phone].    We comply with applicable federal civil rights laws and [place] laws. We do not discriminate on the basis of race, color, national origin, age, disability, sex, sexual orientation, or gender identity.            Thank you!     Thank you for choosing [facility]  for your care. Our goal is always to provide you with excellent care. Hearing back from our patients is one way we can continue to improve our services. Please take a few minutes to complete the written survey that you may receive in the mail after your visit with us. Thank you!             Your Updated Medication List - Protect others around you: Learn how to safely use, store and throw away your medicines at www.disposemymeds.org.          This list is accurate as of 9/25/18 10:25 AM.  Always use your most recent med list.                   Brand Name Dispense Instructions for use Diagnosis    DAILY MULTIVITAMIN PO      Take 1 tablet by mouth daily        DIGOXIN PO      Take 125 mcg by mouth daily 1/2 tablet once daily        GLIPIZIDE PO      Take 10 mg by mouth 2 times daily (before meals)        LIPITOR PO      Take 80 mg by mouth daily 1/2 tablet daily    Malignant neoplasm of overlapping sites of bladder (H)       losartan 25 MG tablet    COZAAR     Take 25 mg by mouth daily    Malignant neoplasm of overlapping sites of bladder (H)       metoprolol tartrate 50 MG tablet    LOPRESSOR     Take 75 mg by mouth        order for DME     1 Month    Equipment being ordered: Urostomy supplies    Bladder cancer (H)       saxagliptin 2.5 MG Tabs tablet    ONGLYZA     Take by mouth daily    Malignant neoplasm of overlapping sites of bladder (H)       warfarin 5 MG tablet    COUMADIN    30 tablet    Take 5 mg by mouth daily Except 1/2 pill on Monday    Chronic atrial fibrillation (H)

## 2018-10-01 LAB
COPATH REPORT: NORMAL
COPATH REPORT: NORMAL

## 2018-10-10 ENCOUNTER — ONCOLOGY VISIT (OUTPATIENT)
Dept: ONCOLOGY | Facility: CLINIC | Age: 78
End: 2018-10-10
Attending: UROLOGY
Payer: MEDICARE

## 2018-10-10 VITALS
BODY MASS INDEX: 25.48 KG/M2 | RESPIRATION RATE: 16 BRPM | WEIGHT: 178 LBS | DIASTOLIC BLOOD PRESSURE: 72 MMHG | TEMPERATURE: 97 F | SYSTOLIC BLOOD PRESSURE: 111 MMHG | OXYGEN SATURATION: 97 % | HEART RATE: 66 BPM | HEIGHT: 70 IN

## 2018-10-10 DIAGNOSIS — C67.8 MALIGNANT NEOPLASM OF OVERLAPPING SITES OF BLADDER (H): Primary | ICD-10-CM

## 2018-10-10 PROCEDURE — G0463 HOSPITAL OUTPT CLINIC VISIT: HCPCS

## 2018-10-10 PROCEDURE — 99213 OFFICE O/P EST LOW 20 MIN: CPT | Performed by: UROLOGY

## 2018-10-10 ASSESSMENT — PAIN SCALES - GENERAL: PAINLEVEL: NO PAIN (0)

## 2018-10-10 NOTE — MR AVS SNAPSHOT
After Visit Summary   10/10/2018    Baldo Landon    MRN: [id number]           Patient Information     Date Of Birth          1940        Visit Information        Provider Department      10/10/2018 9:00 AM Ruddy Betancourt MD [facility] RH Oncology [facility]      Today's Diagnoses     Malignant neoplasm of overlapping sites of bladder (H)    -  1      Care Instructions    Follow up with Dr. Betancourt in 1 year    Would recommend tight glucose control given rising Serum Creatinine in the absence of other causes such as hydronephrosis or obstruction, suggesting this is medical renal disease.     Will refer to General Surgery for parastomal hernia repair; will need to be  at the [place]     Is scheduled to see the nephrologist for rising Serum Creatinine     If nausea in the future that wont resolve should consider hyperchloremic metabolic acidosis, which is possible with ileal urinary diversions.  Stefan Vásquez RN, BSN, OCN  [facility]  Patient Care Coordinator                Follow-ups after your visit        Your next 10 appointments already scheduled     Apr 02, 2019 12:30 PM CDT   LAB with  LAB DRAW 1   [facility] ([facility])    [facility]  [address] [place] [address]  [address]  [facility] [id number] [phone]           Please do not eat 10-12 hours before your appointment if you are coming in fasting for labs on lipids, cholesterol, or glucose (sugar). This does not apply to pregnant women. Water, hot tea and black coffee (with nothing added) are okay. Do not drink other fluids, diet soda or chew gum.            Apr 02, 2019  1:00 PM CDT   CT CHEST/ABDOMEN/PELVIS W CONTRAST with RSCCCT1   [facility] ([facility])    [address]  [facility] [id number] [phone]           How do I prepare for my  exam? (Food and drink instructions) To prepare: Do not eat or drink for 2 hours before your exam. If you need to take medicine, you may take it with small sips of water. (We may ask you to take liquid medicine as well.)  How do I prepare for my exam? (Other instructions) Please arrive 30 minutes early for your CT.  Once in the department you might be asked to drink water 15-20 minutes prior to your exam.  If indicated you may be asked to drink an oral contrast in advance of your CT.  If this is the case, the imaging team will let you know or be in contact with you prior to your appointment  Patients over 70 or patients with diabetes or kidney problems: If you haven t had a blood test (creatinine test) within the last 30 days, the Cardiologist/Radiologist may require you to get this test prior to your exam.  If you have diabetes:  Continue to take your metformin medication on the day of your exam  What should I wear: Please wear loose clothing, such as a sweat suit or jogging clothes. Avoid snaps, zippers and other metal. We may ask you to undress and put on a hospital gown.  How long does the exam take: Most scans take less than 20 minutes.  What should I bring: Please bring any scans or X-rays taken at other hospitals, if similar tests were done. Also bring a list of your medicines, including vitamins, minerals and over-the-counter drugs. It is safest to leave personal items at home.  Do I need a : No  is needed.  What do I need to tell my doctor? Be sure to tell your doctor: * If you have any allergies. * If there s any chance you are pregnant. * If you are breastfeeding.  What should I do after the exam: No restrictions, You may resume normal activities.  What is this test: A CT (computed tomography) scan is a series of pictures that allows us to look inside your body. The scanner creates images of the body in cross sections, much like slices of bread. This helps us see any problems more clearly. You  may receive contrast (X-ray dye) before or during your scan. You will be asked to drink the contrast.  Who should I call with questions: If you have any questions, please call the Imaging Department where you will have your exam. Directions, parking instructions, and other information is available on our website, [email or web address].            Apr 02, 2019  1:45 PM CDT   Return Visit with Bradford Pires MD   [facility] ([facility])    [facility]  [address] [place] [address]  [facility] [address]   [phone]            Oct 09, 2019  9:00 AM CDT   Return Visit with Ruddy Betancourt MD   [facility] ([facility])    [facility]  [address] [place] [address]  [facility] [address] [phone]              Who to contact     If you have questions or need follow up information about today's clinic visit or your schedule please contact [facility] directly at [phone].  Normal or non-critical lab and imaging results will be communicated to you by Voltarihart, letter or phone within 4 business days after the clinic has received the results. If you do not hear from us within 7 days, please contact the clinic through nlyte Softwaret or phone. If you have a critical or abnormal lab result, we will notify you by phone as soon as possible.  Submit refill requests through [employer] or call your pharmacy and they will forward the refill request to us. Please allow 3 business days for your refill to be completed.          Additional Information About Your Visit        VoltariharDatameer Information     [employer] gives you secure access to your electronic health record. If you see a primary care provider, you can also send messages to your care team and make appointments. If you have questions, please call your primary care clinic.  If you do not have a primary care provider, please call  "[phone] and they will assist you.        Care EveryWhere ID     This is your Care EveryWhere ID. This could be used by other organizations to access your [place] medical records  [id number]        Your Vitals Were     Pulse Temperature Respirations Height Pulse Oximetry BMI (Body Mass Index)    66 97  F (36.1  C) (Tympanic) 16 1.778 m (5' 10\") 97% 25.54 kg/m2       Blood Pressure from Last 3 Encounters:   10/10/18 111/72   09/25/18 99/60   04/06/18 115/73    Weight from Last 3 Encounters:   10/10/18 80.7 kg (178 lb)   09/25/18 79.8 kg (176 lb)   04/06/18 79.8 kg (176 lb)              Today, you had the following     No orders found for display       Primary Care Provider Office Phone # Fax #    Luis Monge [phone]       [place] [address]        Equal Access to Services     MAXX ONEAL : Hadii nathanael ku hadasho Soomaali, waaxda luqadaha, qaybta kaalmada adeegyada, waxay soledad lang . So [facility] [phone].    ATENCIÓN: Si habla español, tiene a dudley disposición servicios gratuitos de asistencia lingüística. Bradleyame al [phone].    We comply with applicable federal civil rights laws and [place] laws. We do not discriminate on the basis of race, color, national origin, age, disability, sex, sexual orientation, or gender identity.            Thank you!     Thank you for choosing [facility] CARE  for your care. Our goal is always to provide you with excellent care. Hearing back from our patients is one way we can continue to improve our services. Please take a few minutes to complete the written survey that you may receive in the mail after your visit with us. Thank you!             Your Updated Medication List - Protect others around you: Learn how to safely use, store and throw away your medicines at www.disposemymeds.org.          This list is accurate as of 10/10/18  9:26 AM.  Always use your most " recent med list.                   Brand Name Dispense Instructions for use Diagnosis    DAILY MULTIVITAMIN PO      Take 1 tablet by mouth daily        DIGOXIN PO      Take 125 mcg by mouth daily 1/2 tablet once daily        GLIPIZIDE PO      Take 10 mg by mouth 2 times daily (before meals)        LIPITOR PO      Take 80 mg by mouth daily 1/2 tablet daily    Malignant neoplasm of overlapping sites of bladder (H)       losartan 25 MG tablet    COZAAR     Take 25 mg by mouth daily    Malignant neoplasm of overlapping sites of bladder (H)       metoprolol tartrate 50 MG tablet    LOPRESSOR     Take 75 mg by mouth        order for DME     1 Month    Equipment being ordered: Urostomy supplies    Bladder cancer (H)       saxagliptin 2.5 MG Tabs tablet    ONGLYZA     Take by mouth daily    Malignant neoplasm of overlapping sites of bladder (H)       warfarin 5 MG tablet    COUMADIN    30 tablet    Take 5 mg by mouth daily 1/2 pill everyday except on Monday    Chronic atrial fibrillation (H)

## 2018-10-10 NOTE — LETTER
"    10/10/2018         RE: Baldo Landon  [address]  [facility] [address]        Dear Colleague,    Thank you for referring your patient, Baldo Landon, to the [facility]. Please see a copy of my visit note below.    Mr Landon is a pleasant 78 year old man here with history of a cystoprostatectomy performed on 9/15/2015 with a post-op course complicated with a-fib, prolonged ileus.  However doing really well now.      Neoadjuvant chemotherapy MVAC  TMN Stage: E5D6AlF5, Original stage was T2  Number of nodes removed: 17  Number of positive nodes: 0  Surgical Margins : negative  Grade: high  Histology: urothelial without secondary histology  Surgery: Radical Cx and Ileal conduit 9/15    No problems with ostomy device.  No blood in urine.  No flank pain.      PE  /72  Pulse 66  Temp 97  F (36.1  C) (Tympanic)  Resp 16  Ht 1.778 m (5' 10\")  Wt 80.7 kg (178 lb)  SpO2 97%  BMI 25.54 kg/m2    Incision sites are healed well.  Worsening bulge around stoma, and now having trouble with leaking of the pouch    CT shows no evidence of disease and no hydro to explain slight Cr rise. Large kidney cyst, non contrast scan    Lab Results   Component Value Date    CR 2.11 09/25/2018    CR 1.89 03/28/2018    CR 1.79 09/12/2017    CR 1.76 03/13/2017    CR 1.73 09/16/2016    CR 1.61 06/07/2016    CR 1.59 03/18/2016    CR 1.43 11/10/2015    CR 1.12 09/28/2015    CR 1.09 09/26/2015         Recently had a colonoscopy and had a little setback from that, but feeling much better now    Assessment history of pT2(T1 after MVAC)[id number] urothelial cancer with no evidence of disease s/p Rad Cx 9/15    Plan:    We will follow up in [place] in ~6 months with CT a/p and cbc, bmp    Imaging and labs per Dr. Pires    Doing well overall     Would recommend tight glucose control given rising SCr in the absence of hydro or obstruction, suggesting this is medical renal disease.    Will refer to General Surgery for " parastomal hernia repair    Is scheduled to see the nephrologist for rising SCr    If nausea in the future that wont resolve should consider hyperchloremic metabolic acidosis, which is possible with ileal urinary diversions.    Ruddy Betancourt MD  Department of Urology Staff  [facility]        Again, thank you for allowing me to participate in the care of your patient.        Sincerely,        Ruddy Betancourt MD

## 2018-10-10 NOTE — NURSING NOTE
"Oncology Rooming Note    October 10, 2018 8:42 AM   Baldo Landon is a 78 year old male who presents for:    Chief Complaint   Patient presents with     Oncology Clinic Visit     Bladder Cancer     Initial Vitals: /72  Pulse 66  Temp 97  F (36.1  C) (Tympanic)  Resp 16  Ht 1.778 m (5' 10\")  Wt 80.7 kg (178 lb)  SpO2 97%  BMI 25.54 kg/m2 Estimated body mass index is 25.54 kg/(m^2) as calculated from the following:    Height as of this encounter: 1.778 m (5' 10\").    Weight as of this encounter: 80.7 kg (178 lb). Body surface area is 2 meters squared.  No Pain (0) Comment: Data Unavailable   No LMP for male patient.  Allergies reviewed: Yes  Medications reviewed: Yes    Medications: Medication refills not needed today.  Pharmacy name entered into [employer]:    [place], [place] - [address] [facility] [address] [place], [place] - [address] [id number] [place], [place] - [address]    Clinical concerns: follow up     8 minutes for nursing intake (face to face time)     Steph Green CMA     DISCHARGE PLAN:  Next appointments: See patient instruction section  Departure Mode: Ambulatory  Accompanied by: self  0 minutes for nursing discharge (face to face time)   Steph Green CMA                  "

## 2018-10-10 NOTE — PATIENT INSTRUCTIONS
Follow up with Dr. Betancourt in 1 year scheduled terra    Would recommend tight glucose control given rising Serum Creatinine in the absence of other causes such as hydronephrosis or obstruction, suggesting this is medical renal disease.     Will refer to General Surgery for parastomal hernia repair; [facility]     Is scheduled to see the nephrologist for rising Serum Creatinine     If nausea in the future that wont resolve should consider hyperchloremic metabolic acidosis, which is possible with ileal urinary diversions.  Stefan Vásquez, RN, BSN, OCN  [facility]  Patient Care Coordinator

## 2018-10-12 DIAGNOSIS — K46.9 PERISTOMAL HERNIA: Primary | ICD-10-CM

## 2018-10-25 ENCOUNTER — PATIENT OUTREACH (OUTPATIENT)
Dept: CARE COORDINATION | Facility: CLINIC | Age: 78
End: 2018-10-25

## 2018-10-26 ENCOUNTER — CARE COORDINATION (OUTPATIENT)
Dept: SURGERY | Facility: CLINIC | Age: 78
End: 2018-10-26

## 2018-10-26 NOTE — PROGRESS NOTES
Pre Visit Call and Assessment    Date of call:  10/26/2018    Phone numbers:  Home number on file [phone] (home)    Reached patient/confirmed appointment:  Yes  Patient care team/Primary provider:  Luis Monge  Preferred outpatient pharmacy:    [place], [place] - [address] [facility]  [address] [place] [address]  Phone: [phone] Fax: [phone]    [facility] [address] [place] [address] - [facility] [address] [address]  Phone: [phone] Fax: [phone]    [id number] [place], [place] - [address] [address] [facility] [address]  Phone: [phone] Fax: [phone]    Referred to:  Dr. Natanael Nowak    Reason for visit:  Hernia consult

## 2018-10-29 ENCOUNTER — OFFICE VISIT (OUTPATIENT)
Dept: SURGERY | Facility: CLINIC | Age: 78
End: 2018-10-29
Payer: COMMERCIAL

## 2018-10-29 VITALS
TEMPERATURE: 97.7 F | SYSTOLIC BLOOD PRESSURE: 120 MMHG | OXYGEN SATURATION: 98 % | BODY MASS INDEX: 25.35 KG/M2 | HEIGHT: 70 IN | WEIGHT: 177.1 LBS | DIASTOLIC BLOOD PRESSURE: 77 MMHG | HEART RATE: 99 BPM

## 2018-10-29 DIAGNOSIS — K43.5 PARASTOMAL HERNIA WITHOUT OBSTRUCTION OR GANGRENE: Primary | ICD-10-CM

## 2018-10-29 ASSESSMENT — PAIN SCALES - GENERAL: PAINLEVEL: NO PAIN (0)

## 2018-10-29 NOTE — MR AVS SNAPSHOT
After Visit Summary   10/29/2018    Baldo Landon    MRN: [id number]           Patient Information     Date Of Birth          1940        Visit Information        Provider Department      10/29/2018 9:30 AM Natanael Nowak MD [facility] Surgery        Today's Diagnoses     Parastomal hernia without obstruction or gangrene    -  1       Follow-ups after your visit        Your next 10 appointments already scheduled     Apr 02, 2019 12:30 PM CDT   LAB with RH LAB DRAW 1   [facility] ([facility])    [facility]  [address]  [address]  [facility] [address]   [phone]           Please do not eat 10-12 hours before your appointment if you are coming in fasting for labs on lipids, cholesterol, or glucose (sugar). This does not apply to pregnant women. Water, hot tea and black coffee (with nothing added) are okay. Do not drink other fluids, diet soda or chew gum.            Apr 02, 2019  1:00 PM CDT   CT CHEST/ABDOMEN/PELVIS W CONTRAST with [id number]   [facility] ([facility])    [address]  [facility] [id number] [phone]           How do I prepare for my exam? (Food and drink instructions) To prepare: Do not eat or drink for 2 hours before your exam. If you need to take medicine, you may take it with small sips of water. (We may ask you to take liquid medicine as well.)  How do I prepare for my exam? (Other instructions) Please arrive 30 minutes early for your CT.  Once in the department you might be asked to drink water 15-20 minutes prior to your exam.  If indicated you may be asked to drink an oral contrast in advance of your CT.  If this is the case, the imaging team will let you know or be in contact with you prior to your appointment  Patients over 70 or patients with diabetes or kidney problems: If you haven t had a blood test  (creatinine test) within the last 30 days, the Cardiologist/Radiologist may require you to get this test prior to your exam.  If you have diabetes:  Continue to take your metformin medication on the day of your exam  What should I wear: Please wear loose clothing, such as a sweat suit or jogging clothes. Avoid snaps, zippers and other metal. We may ask you to undress and put on a hospital gown.  How long does the exam take: Most scans take less than 20 minutes.  What should I bring: Please bring any scans or X-rays taken at other hospitals, if similar tests were done. Also bring a list of your medicines, including vitamins, minerals and over-the-counter drugs. It is safest to leave personal items at home.  Do I need a : No  is needed.  What do I need to tell my doctor? Be sure to tell your doctor: * If you have any allergies. * If there s any chance you are pregnant. * If you are breastfeeding.  What should I do after the exam: No restrictions, You may resume normal activities.  What is this test: A CT (computed tomography) scan is a series of pictures that allows us to look inside your body. The scanner creates images of the body in cross sections, much like slices of bread. This helps us see any problems more clearly. You may receive contrast (X-ray dye) before or during your scan. You will be asked to drink the contrast.  Who should I call with questions: If you have any questions, please call the Imaging Department where you will have your exam. Directions, parking instructions, and other information is available on our website, [email or web address].            Apr 02, 2019  1:45 PM CDT   Return Visit with Bradford Pires MD   [facility] ([facility])    [facility]  [address]  [address]  [facility] [address]   [phone]            Oct 09, 2019  9:00 AM CDT   Return Visit with Ruddy Betancourt MD   [facility]  "[place] [facility] ([facility])    [facility]  [address]  [address]  [facility] [address] [phone]              Who to contact     Please call your clinic at [phone] to:    Ask questions about your health    Make or cancel appointments    Discuss your medicines    Learn about your test results    Speak to your doctor            Additional Information About Your Visit        [email or web address] Information     [employer] gives you secure access to your electronic health record. If you see a primary care provider, you can also send messages to your care team and make appointments. If you have questions, please call your primary care clinic.  If you do not have a primary care provider, please call [phone] and they will assist you.      [employer] is an electronic gateway that provides easy, online access to your medical records. With [employer], you can request a clinic appointment, read your test results, renew a prescription or communicate with your care team.     To access your existing account, please contact your [facility] or call [phone] for assistance.        Care EveryWhere ID     This is your Care EveryWhere ID. This could be used by other organizations to access your [place] medical records  [id number]        Your Vitals Were     Pulse Temperature Height Pulse Oximetry BMI (Body Mass Index)       99 97.7  F (36.5  C) (Oral) 5' 10\" 98% 25.41 kg/m2        Blood Pressure from Last 3 Encounters:   10/29/18 120/77   10/10/18 111/72   09/25/18 99/60    Weight from Last 3 Encounters:   10/29/18 177 lb 1.6 oz   10/10/18 178 lb   09/25/18 176 lb              Today, you had the following     No orders found for display       Primary Care Provider Office Phone # Fax #    Luis Monge [phone]       [facility] [address] [facility] [address]        Equal Access to Services     MAXX ONEAL AH: Hadii aad " yamel Singh, waphucda luquintinadaha, qaalinata kaalmada yuridia, didier goldyin hayaakarri hardenrosalina yeimyoseas lajessekarri nikkie. So [facility] [phone].    ATENCIÓN: Si habla raul, tiene a dudley disposición servicios gratuitos de asistencia lingüística. Giovanni al [phone].    We comply with applicable federal civil rights laws and [place] laws. We do not discriminate on the basis of race, color, national origin, age, disability, sex, sexual orientation, or gender identity.            Thank you!     Thank you for choosing [facility]  for your care. Our goal is always to provide you with excellent care. Hearing back from our patients is one way we can continue to improve our services. Please take a few minutes to complete the written survey that you may receive in the mail after your visit with us. Thank you!             Your Updated Medication List - Protect others around you: Learn how to safely use, store and throw away your medicines at www.disposemymeds.org.          This list is accurate as of 10/29/18 11:26 AM.  Always use your most recent med list.                   Brand Name Dispense Instructions for use Diagnosis    DAILY MULTIVITAMIN PO      Take 1 tablet by mouth daily        DIGOXIN PO      Take 125 mcg by mouth daily 1/2 tablet once daily        GLIPIZIDE PO      Take 10 mg by mouth 2 times daily (before meals)        LIPITOR PO      Take 80 mg by mouth daily 1/2 tablet daily    Malignant neoplasm of overlapping sites of bladder (H)       losartan 25 MG tablet    COZAAR     Take 25 mg by mouth daily    Malignant neoplasm of overlapping sites of bladder (H)       metoprolol tartrate 50 MG tablet    LOPRESSOR     Take 75 mg by mouth        order for DME     1 Month    Equipment being ordered: Urostomy supplies    Bladder cancer (H)       saxagliptin 2.5 MG Tabs tablet    ONGLYZA     Take by mouth daily    Malignant neoplasm of overlapping sites of bladder (H)       warfarin 5 MG tablet    COUMADIN    30  tablet    Take 5 mg by mouth daily 1/2 pill everyday except on Monday    Chronic atrial fibrillation (H)

## 2018-10-29 NOTE — NURSING NOTE
"Chief Complaint   Patient presents with     Consult     NEW - Parastomal Hernia        Vitals:    10/29/18 0925   BP: 120/77   Pulse: 99   Temp: 97.7  F (36.5  C)   TempSrc: Oral   SpO2: 98%   Weight: 177 lb 1.6 oz   Height: 5' 10\"       Body mass index is 25.41 kg/(m^2).    Devin Sparrow on 10/29/2018 at 9:30 AM                          "

## 2018-10-29 NOTE — PROGRESS NOTES
Baldo Landon is a 78 year old male s/p bladder resection for CA with ileal diversion 3 years ago who has had parastomal hernia for some time. More recently some difficulty with bag. Has not tried binder.  Obstructive symptoms:  no  Urinary difficulties:  no  Chronic cough: no  Constipation:  no  Current level of activity:  Low, actively retired.    Past medical history, medications, allergies, family history, and social history were reviewed with the patient.    Past Medical History:   Diagnosis Date     Antiplatelet or antithrombotic long-term use      Arrhythmia     Afib     Atrial fibrillation (H)      Bladder cancer (H)      Diabetes (H)      Hypertension      Prostate cancer (H) 2005     Unspecified cerebral artery occlusion with cerebral infarction        Past Surgical History:   Procedure Laterality Date     APPENDECTOMY       CYSTOSCOPY, RETROGRADES, INSERT STENT URETER(S), COMBINED Left 5/11/2015    Procedure: COMBINED CYSTOSCOPY, RETROGRADES, INSERT STENT URETER(S);  Surgeon: Ruddy Betancourt MD;  Location: [facility] OR     CYSTOSCOPY, TRANSURETHRAL RESECTION (TUR) TUMOR BLADDER, COMBINED Left 5/11/2015    Procedure: COMBINED CYSTOSCOPY, TRANSURETHRAL RESECTION (TUR) TUMOR BLADDER;  Surgeon: Ruddy Betancourt MD;  Location: [facility] OR     DAVINCI CYSTECTOMY BLADDER RADICAL, ILEAL DIVERSION, COMBINED N/A 9/15/2015    Procedure: COMBINED DAVINCI CYSTECTOMY BLADDER RADICAL, ILEAL DIVERSION;  Surgeon: Ruddy Betancourt MD;  Location: [facility] OR     DAVINCI CYSTOPROSTATECTOMY N/A 9/15/2015    Procedure: DAVINCI CYSTOPROSTATECTOMY;  Surgeon: Ruddy Betancourt MD;  Location: [facility] OR     ENT SURGERY      tonsillectomy     HERNIA REPAIR       ORTHOPEDIC SURGERY      knee scope     RADIATION THERAPY DATE(S)       ROS: 10 point review of systems negative except noted in HPI  PHYSICAL EXAM  General appearance- healthy, alert, and in no distress.  Skin- Skin color and turgor normal.  No obvious  rashes.  Neck- Neck is supple without obvious adenopathy.  Lungs- Respiratory effort unlabored.  Gait- Normal.  Abdomen - soft non distended, non tender with RLQ ileal diversion loop bagged and with surrounding wide based hernia.  CT scan 9/25/18 reviewed. Wide based parastomal hernia.  Impression: parastomal hernia after ileal diversion loop. Would start with conservative measures before recommending surgery.  Today reviewed use of binder as modified for ostomy. Also will see stomal Rn to see if other products to help.  Will see me again should hernia become more symptomatic.   The total time spent with this patient and his wife was 30 minutes.  Of this time, greater than 50% was spent counseling and coordinating care.

## 2019-04-02 ENCOUNTER — ONCOLOGY VISIT (OUTPATIENT)
Dept: ONCOLOGY | Facility: CLINIC | Age: 79
End: 2019-04-02
Attending: INTERNAL MEDICINE
Payer: MEDICARE

## 2019-04-02 ENCOUNTER — HOSPITAL ENCOUNTER (OUTPATIENT)
Facility: CLINIC | Age: 79
Setting detail: SPECIMEN
Discharge: HOME | End: 2019-04-02
Attending: INTERNAL MEDICINE | Admitting: INTERNAL MEDICINE
Payer: MEDICARE

## 2019-04-02 ENCOUNTER — HOSPITAL ENCOUNTER (OUTPATIENT)
Dept: CT IMAGING | Facility: CLINIC | Age: 79
Discharge: HOME | End: 2019-04-02
Attending: INTERNAL MEDICINE | Admitting: INTERNAL MEDICINE
Payer: MEDICARE

## 2019-04-02 ENCOUNTER — HOSPITAL ENCOUNTER (OUTPATIENT)
Facility: CLINIC | Age: 79
Setting detail: SPECIMEN
End: 2019-04-02
Attending: INTERNAL MEDICINE
Payer: MEDICARE

## 2019-04-02 VITALS
OXYGEN SATURATION: 98 % | BODY MASS INDEX: 25.48 KG/M2 | SYSTOLIC BLOOD PRESSURE: 135 MMHG | DIASTOLIC BLOOD PRESSURE: 86 MMHG | WEIGHT: 178 LBS | HEIGHT: 70 IN | TEMPERATURE: 97 F | HEART RATE: 87 BPM | RESPIRATION RATE: 16 BRPM

## 2019-04-02 DIAGNOSIS — C67.8 MALIGNANT NEOPLASM OF OVERLAPPING SITES OF BLADDER (H): Primary | ICD-10-CM

## 2019-04-02 DIAGNOSIS — C67.8 MALIGNANT NEOPLASM OF OVERLAPPING SITES OF BLADDER (H): ICD-10-CM

## 2019-04-02 LAB
ALBUMIN SERPL-MCNC: 3.5 G/DL (ref 3.4–5)
ALP SERPL-CCNC: 118 U/L (ref 40–150)
ALT SERPL W P-5'-P-CCNC: 26 U/L (ref 0–70)
ANION GAP SERPL CALCULATED.3IONS-SCNC: 8 MMOL/L (ref 3–14)
AST SERPL W P-5'-P-CCNC: 20 U/L (ref 0–45)
BASOPHILS # BLD AUTO: 0 10E9/L (ref 0–0.2)
BASOPHILS NFR BLD AUTO: 0.4 %
BILIRUB SERPL-MCNC: 1.3 MG/DL (ref 0.2–1.3)
BUN SERPL-MCNC: 32 MG/DL (ref 7–30)
CALCIUM SERPL-MCNC: 9.2 MG/DL (ref 8.5–10.1)
CHLORIDE SERPL-SCNC: 108 MMOL/L (ref 94–109)
CO2 SERPL-SCNC: 18 MMOL/L (ref 20–32)
CREAT SERPL-MCNC: 1.8 MG/DL (ref 0.66–1.25)
DIFFERENTIAL METHOD BLD: ABNORMAL
EOSINOPHIL # BLD AUTO: 0.1 10E9/L (ref 0–0.7)
EOSINOPHIL NFR BLD AUTO: 1.1 %
ERYTHROCYTE [DISTWIDTH] IN BLOOD BY AUTOMATED COUNT: 13.8 % (ref 10–15)
GFR SERPL CREATININE-BSD FRML MDRD: 36 ML/MIN/{1.73_M2}
GLUCOSE SERPL-MCNC: 236 MG/DL (ref 70–99)
HCT VFR BLD AUTO: 44.4 % (ref 40–53)
HGB BLD-MCNC: 14.8 G/DL (ref 13.3–17.7)
IMM GRANULOCYTES # BLD: 0.1 10E9/L (ref 0–0.4)
IMM GRANULOCYTES NFR BLD: 0.9 %
LDH SERPL L TO P-CCNC: 198 U/L (ref 85–227)
LYMPHOCYTES # BLD AUTO: 1.2 10E9/L (ref 0.8–5.3)
LYMPHOCYTES NFR BLD AUTO: 14.9 %
MCH RBC QN AUTO: 31.2 PG (ref 26.5–33)
MCHC RBC AUTO-ENTMCNC: 33.3 G/DL (ref 31.5–36.5)
MCV RBC AUTO: 94 FL (ref 78–100)
MONOCYTES # BLD AUTO: 0.7 10E9/L (ref 0–1.3)
MONOCYTES NFR BLD AUTO: 8.8 %
NEUTROPHILS # BLD AUTO: 6.1 10E9/L (ref 1.6–8.3)
NEUTROPHILS NFR BLD AUTO: 73.9 %
NRBC # BLD AUTO: 0 10*3/UL
NRBC BLD AUTO-RTO: 0 /100
PLATELET # BLD AUTO: 146 10E9/L (ref 150–450)
POTASSIUM SERPL-SCNC: 4.4 MMOL/L (ref 3.4–5.3)
PROT SERPL-MCNC: 7.1 G/DL (ref 6.8–8.8)
RBC # BLD AUTO: 4.75 10E12/L (ref 4.4–5.9)
SODIUM SERPL-SCNC: 134 MMOL/L (ref 133–144)
WBC # BLD AUTO: 8.2 10E9/L (ref 4–11)

## 2019-04-02 PROCEDURE — 74176 CT ABD & PELVIS W/O CONTRAST: CPT

## 2019-04-02 PROCEDURE — 99214 OFFICE O/P EST MOD 30 MIN: CPT | Performed by: INTERNAL MEDICINE

## 2019-04-02 PROCEDURE — 88112 CYTOPATH CELL ENHANCE TECH: CPT | Performed by: INTERNAL MEDICINE

## 2019-04-02 PROCEDURE — 36415 COLL VENOUS BLD VENIPUNCTURE: CPT

## 2019-04-02 PROCEDURE — 85025 COMPLETE CBC W/AUTO DIFF WBC: CPT | Performed by: INTERNAL MEDICINE

## 2019-04-02 PROCEDURE — 80053 COMPREHEN METABOLIC PANEL: CPT | Performed by: INTERNAL MEDICINE

## 2019-04-02 PROCEDURE — G0463 HOSPITAL OUTPT CLINIC VISIT: HCPCS

## 2019-04-02 PROCEDURE — 83615 LACTATE (LD) (LDH) ENZYME: CPT | Performed by: INTERNAL MEDICINE

## 2019-04-02 ASSESSMENT — MIFFLIN-ST. JEOR: SCORE: 1543.65

## 2019-04-02 ASSESSMENT — PAIN SCALES - GENERAL: PAINLEVEL: NO PAIN (0)

## 2019-04-02 NOTE — NURSING NOTE
"Oncology Rooming Note    April 2, 2019 1:37 PM   Baldo Landon is a 78 year old male who presents for:    Chief Complaint   Patient presents with     Oncology Clinic Visit     Bladder cancer      Initial Vitals: /86   Pulse 87   Temp 97  F (36.1  C) (Tympanic)   Resp 16   Ht 1.778 m (5' 10\")   Wt 80.7 kg (178 lb)   SpO2 98%   BMI 25.54 kg/m   Estimated body mass index is 25.54 kg/m  as calculated from the following:    Height as of this encounter: 1.778 m (5' 10\").    Weight as of this encounter: 80.7 kg (178 lb). Body surface area is 2 meters squared.  No Pain (0) Comment: Data Unavailable   No LMP for male patient.  Allergies reviewed: Yes  Medications reviewed: Yes    Medications: Medication refills not needed today.  Pharmacy name entered into Method:    [place], [place] - [address] [facility] - [place], [place] - [address] [id number] - [place], [place] - [address]    Clinical concerns: follow up       Steph Green CMA              "

## 2019-04-02 NOTE — LETTER
"    4/2/2019         RE: Baldo Landon  [address]  [facility] [address]        Dear Colleague,    Thank you for referring your patient, Baldo Landon, to the [facility]. Please see a copy of my visit note below.    [facility]  HEMATOLOGY ONCOLOGY     DIAGNOSES:     1.  Muscle invasive bladder cancer diagnosed 05/11/2015.    2.  History of prostate cancer.      TREATMENT:     1.  TURBT, 05/11/2015.    2.  Cycle 1 neoadjuvant dose dense MVAC, methotrexate, vinblastine, doxorubicin and cyclophosphamide 06/11/2015 with Neulasta support.  Cycle 2 06/24/2015. Cycle 3 7/8/15, Cycle 4 7/22/15.  3.  Cystoprostatectomy - 9/15/15    SUBJECTIVE:  Baldo Landon comes in for a followup today. He is feeling well.    He had a scan done prior to clinic visit and he is here to review findings.     REVIEW OF SYSTEMS:  A complete review of systems was performed and found to be negative other than pertinent positives mentioned in history of present illness.     Past medical, social histories reviewed.    Meds- Reviewed.     PHYSICAL EXAMINATION:   VITAL SIGNS:/86   Pulse 87   Temp 97  F (36.1  C) (Tympanic)   Resp 16   Ht 1.778 m (5' 10\")   Wt 80.7 kg (178 lb)   SpO2 98%   BMI 25.54 kg/m     GENERAL: Sitting comfortably.   HEENT: Pupils are equal. Oropharynx has small area of mucositis.  NECK: No cervical or supraclavicular lymphadenopathy.   LUNGS: Clear bilaterally.   HEART: S1, S2, regular.   ABDOMEN: Soft, nontender, nondistended, no hepatosplenomegaly.   EXTREMITIES: Warm, well perfused.   NEUROLOGIC: Alert, awake.   SKIN: No rash.   LYMPHATICS: No edema.     DATA:   Recent Labs   Lab Test 04/02/19  1230 09/25/18  1018 03/28/18  1158 09/12/17  1105 03/13/17  1026    138 139 137 138   POTASSIUM 4.4 4.3 4.8 4.5 4.6   CHLORIDE 108 110* 110* 107 108   CO2 18* 20 21 23 21   ANIONGAP 8 8 8 7 9   BUN 32* 42* 40* 39* 38*   CR 1.80* 2.11* 1.89* 1.79* 1.76*   * 209* 291* " 235* 252*   CARSON 9.2 8.3* 9.0 9.7 9.5     Recent Labs   Lab Test 09/28/15  0829 09/27/15  1032 09/26/15  0556 09/25/15  0705 09/24/15  0705 09/23/15  0645 09/22/15  0630   MAG 1.8 2.0 1.7 1.9 1.8 1.7  --    PHOS 3.3  --   --  2.8 2.8 2.6 4.0     Recent Labs   Lab Test 04/02/19  1230 09/25/18  1018 03/28/18  1158 09/12/17  1105 03/13/17  1026   WBC 8.2 6.1 6.4 8.2 7.3   HGB 14.8 13.4 12.8* 13.4 14.4   * 125* 136* 191 162   MCV 94 95 93 93 95   NEUTROPHIL 73.9 76.4 70.2 77.8 72.0     Recent Labs   Lab Test 04/02/19  1230 09/25/18  1018 03/28/18  1158   BILITOTAL 1.3 1.3 0.8   ALKPHOS 118 111 103   ALT 26 30 27   AST 20 18 14   ALBUMIN 3.5 3.5 3.2*    159 147     No results found for: TSH  No results for input(s): CEA in the last 38104 hours.  Results for orders placed or performed during the hospital encounter of 04/02/19   CT Chest Abdomen Pelvis w/o Contrast    Narrative    CT CHEST/ABDOMEN/PELVIS WITHOUT CONTRAST April 2, 2019 1:09 PM     HISTORY: Restaging bladder cancer. Malignant neoplasm of overlapping  sites of bladder (H).    TECHNIQUE: Radiation dose for this scan was reduced using automated  exposure control, adjustment of the mA and/or kV according to patient  size, or iterative reconstruction technique. No IV contrast.    COMPARISON: 9/25/2018.    FINDINGS:     Chest: A few tiny calcified granulomas are noted. There is minimal  scarring along the left major fissure. Pleural-based nodule in the  medial right middle lobe measuring 6 mm is unchanged. No new pulmonary  nodule or mass. No pleural or pericardial effusion. There is mild  coronary atherosclerosis. No enlarged thoracic or axillary lymph  nodes. Thyroid is unremarkable.    Abdomen: Intra-abdominal organs are incompletely evaluated without  intravenous contrast. There is pneumobilia. There is air also present  in the gallbladder. No liver lesions. 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 The spleen, pancreas, and  adrenal glands  are unremarkable. No hydronephrosis of either kidney. Large right  renal cyst is unchanged. Tiny calcification in the left kidney is  nonobstructing and unchanged. There is aortic atherosclerosis without  aneurysm. No abdominal or retroperitoneal lymphadenopathy. Urinary  ostomy noted in the right lower quadrant with a fat-containing  parastomal hernia. No ascites, free air, or evidence of bowel  obstruction.    Pelvis: There has been a cystoprostatectomy. No pelvic adenopathy or  free fluid. There is a fat-containing right inguinal hernia.      Impression    IMPRESSION:  1. No evidence of recurrent or metastatic disease in the chest,  abdomen, or pelvis.  2. 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  3. Cystoprostatectomy and urinary diversion. Parastomal hernia  contains only fat.  4. Aortic and coronary atherosclerosis.    OSMAN ROCHA MD         ASSESSMENT:    1. Muscle invasive bladder cancer - post dose dense MVAC ypT1N0 disease  2. CKD stage III;   3. Prostate cancer s/p xrt, TIA - 2010, A fib on warfarin, DM II    I have reviewed actual images from his recent staging CT scan. All his previous lung lesions are stable. Nothing to suggest recurrent disease based on his CT scans, labs or symptoms    His kidney function remains stable though his creatinine is stable from the past. He does have HTN and DM TII in addition to his CKD.    I explained that we could continue to follow him every 6 months. I have suggested that he get his labs, scans and urine exam - week to 10 days prior to clinic visit if feasible. Urine cytology and FISH are pending from today.    Over 25 min of direct face to face time spent with patient with more than 50% time spent in counseling and coordinating care.        Again, thank you for allowing me to participate in the care of your patient.        Sincerely,        Bradford Pires MD

## 2019-04-02 NOTE — PROGRESS NOTES
Infusion Nursing Note:  Baldo Landon presents today for peripheral labs prior to provider/Ct.    Patient seen by provider today: Yes: Dr. Pires   present during visit today: Not Applicable.    Note: Patient CT scan ordered with contrast. Patient states they never do contrast due to his kidney function. Patient wanted only lab draw and if labs are improved will get IV placed in CT.    Intravenous Access:  Lab draw site LAC, Needle type butterfly, Gauge 23.  Labs drawn without difficulty.    Treatment Conditions:  Not Applicable.      Post Infusion Assessment:  Site patent and intact, free from redness, edema or discomfort.       Discharge Plan:   Patient discharged in stable condition to CT scan and will return afterwards to see Dr. Pires accompanied by: self.  Departure Mode: Ambulatory.    Ingrid Hankins RN

## 2019-04-02 NOTE — PROGRESS NOTES
"[facility]  HEMATOLOGY ONCOLOGY     DIAGNOSES:     1.  Muscle invasive bladder cancer diagnosed 05/11/2015.    2.  History of prostate cancer.      TREATMENT:     1.  TURBT, 05/11/2015.    2.  Cycle 1 neoadjuvant dose dense MVAC, methotrexate, vinblastine, doxorubicin and cyclophosphamide 06/11/2015 with Neulasta support.  Cycle 2 06/24/2015. Cycle 3 7/8/15, Cycle 4 7/22/15.  3.  Cystoprostatectomy - 9/15/15    SUBJECTIVE:  Baldo Landon comes in for a followup today. He is feeling well.    He had a scan done prior to clinic visit and he is here to review findings.     REVIEW OF SYSTEMS:  A complete review of systems was performed and found to be negative other than pertinent positives mentioned in history of present illness.     Past medical, social histories reviewed.    Meds- Reviewed.     PHYSICAL EXAMINATION:   VITAL SIGNS:/86   Pulse 87   Temp 97  F (36.1  C) (Tympanic)   Resp 16   Ht 1.778 m (5' 10\")   Wt 80.7 kg (178 lb)   SpO2 98%   BMI 25.54 kg/m    GENERAL: Sitting comfortably.   HEENT: Pupils are equal. Oropharynx has small area of mucositis.  NECK: No cervical or supraclavicular lymphadenopathy.   LUNGS: Clear bilaterally.   HEART: S1, S2, regular.   ABDOMEN: Soft, nontender, nondistended, no hepatosplenomegaly.   EXTREMITIES: Warm, well perfused.   NEUROLOGIC: Alert, awake.   SKIN: No rash.   LYMPHATICS: No edema.     DATA:   Recent Labs   Lab Test 04/02/19  1230 09/25/18  1018 03/28/18  1158 09/12/17  1105 03/13/17  1026    138 139 137 138   POTASSIUM 4.4 4.3 4.8 4.5 4.6   CHLORIDE 108 110* 110* 107 108   CO2 18* 20 21 23 21   ANIONGAP 8 8 8 7 9   BUN 32* 42* 40* 39* 38*   CR 1.80* 2.11* 1.89* 1.79* 1.76*   * 209* 291* 235* 252*   CARSON 9.2 8.3* 9.0 9.7 9.5     Recent Labs   Lab Test 09/28/15  0829 09/27/15  1032 09/26/15  0556 09/25/15  0705 09/24/15  0705 09/23/15  0645 09/22/15  0630   MAG 1.8 2.0 1.7 1.9 1.8 1.7  --    PHOS 3.3  --   --  2.8 2.8 2.6 4.0 "     Recent Labs   Lab Test 04/02/19  1230 09/25/18  1018 03/28/18  1158 09/12/17  1105 03/13/17  1026   WBC 8.2 6.1 6.4 8.2 7.3   HGB 14.8 13.4 12.8* 13.4 14.4   * 125* 136* 191 162   MCV 94 95 93 93 95   NEUTROPHIL 73.9 76.4 70.2 77.8 72.0     Recent Labs   Lab Test 04/02/19  1230 09/25/18  1018 03/28/18  1158   BILITOTAL 1.3 1.3 0.8   ALKPHOS 118 111 103   ALT 26 30 27   AST 20 18 14   ALBUMIN 3.5 3.5 3.2*    159 147     No results found for: TSH  No results for input(s): CEA in the last 49408 hours.  Results for orders placed or performed during the hospital encounter of 04/02/19   CT Chest Abdomen Pelvis w/o Contrast    Narrative    CT CHEST/ABDOMEN/PELVIS WITHOUT CONTRAST April 2, 2019 1:09 PM     HISTORY: Restaging bladder cancer. Malignant neoplasm of overlapping  sites of bladder (H).    TECHNIQUE: Radiation dose for this scan was reduced using automated  exposure control, adjustment of the mA and/or kV according to patient  size, or iterative reconstruction technique. No IV contrast.    COMPARISON: 9/25/2018.    FINDINGS:     Chest: A few tiny calcified granulomas are noted. There is minimal  scarring along the left major fissure. Pleural-based nodule in the  medial right middle lobe measuring 6 mm is unchanged. No new pulmonary  nodule or mass. No pleural or pericardial effusion. There is mild  coronary atherosclerosis. No enlarged thoracic or axillary lymph  nodes. Thyroid is unremarkable.    Abdomen: Intra-abdominal organs are incompletely evaluated without  intravenous contrast. There is pneumobilia. There is air also present  in the gallbladder. No liver lesions. 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 The spleen, pancreas, and adrenal glands  are unremarkable. No hydronephrosis of either kidney. Large right  renal cyst is unchanged. Tiny calcification in the left kidney is  nonobstructing and unchanged. There is aortic atherosclerosis without  aneurysm. No  abdominal or retroperitoneal lymphadenopathy. Urinary  ostomy noted in the right lower quadrant with a fat-containing  parastomal hernia. No ascites, free air, or evidence of bowel  obstruction.    Pelvis: There has been a cystoprostatectomy. No pelvic adenopathy or  free fluid. There is a fat-containing right inguinal hernia.      Impression    IMPRESSION:  1. No evidence of recurrent or metastatic disease in the chest,  abdomen, or pelvis.  2. 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  3. Cystoprostatectomy and urinary diversion. Parastomal hernia  contains only fat.  4. Aortic and coronary atherosclerosis.    OSMAN ROCHA MD         ASSESSMENT:    1. Muscle invasive bladder cancer - post dose dense MVAC ypT1N0 disease  2. CKD stage III;   3. Prostate cancer s/p xrt, TIA - 2010, A fib on warfarin, DM II    I have reviewed actual images from his recent staging CT scan. All his previous lung lesions are stable. Nothing to suggest recurrent disease based on his CT scans, labs or symptoms    His kidney function remains stable though his creatinine is stable from the past. He does have HTN and DM TII in addition to his CKD.    I explained that we could continue to follow him every 6 months. I have suggested that he get his labs, scans and urine exam - week to 10 days prior to clinic visit if feasible. Urine cytology and FISH are pending from today.    Over 25 min of direct face to face time spent with patient with more than 50% time spent in counseling and coordinating care.

## 2019-04-05 LAB — COPATH REPORT: NORMAL

## 2019-04-08 LAB — COPATH REPORT: NORMAL

## 2019-10-07 ENCOUNTER — ONCOLOGY VISIT (OUTPATIENT)
Dept: ONCOLOGY | Facility: CLINIC | Age: 79
End: 2019-10-07
Attending: INTERNAL MEDICINE
Payer: MEDICARE

## 2019-10-07 ENCOUNTER — HOSPITAL ENCOUNTER (OUTPATIENT)
Dept: CT IMAGING | Facility: CLINIC | Age: 79
Discharge: HOME | End: 2019-10-07
Attending: INTERNAL MEDICINE | Admitting: INTERNAL MEDICINE
Payer: MEDICARE

## 2019-10-07 VITALS
WEIGHT: 173.8 LBS | SYSTOLIC BLOOD PRESSURE: 105 MMHG | HEART RATE: 75 BPM | BODY MASS INDEX: 24.88 KG/M2 | TEMPERATURE: 97 F | OXYGEN SATURATION: 98 % | HEIGHT: 70 IN | RESPIRATION RATE: 16 BRPM | DIASTOLIC BLOOD PRESSURE: 71 MMHG

## 2019-10-07 DIAGNOSIS — C67.8 MALIGNANT NEOPLASM OF OVERLAPPING SITES OF BLADDER (H): Primary | ICD-10-CM

## 2019-10-07 DIAGNOSIS — C67.8 MALIGNANT NEOPLASM OF OVERLAPPING SITES OF BLADDER (H): ICD-10-CM

## 2019-10-07 LAB
ALBUMIN SERPL-MCNC: 3.4 G/DL (ref 3.4–5)
ALP SERPL-CCNC: 107 U/L (ref 40–150)
ALT SERPL W P-5'-P-CCNC: 29 U/L (ref 0–70)
ANION GAP SERPL CALCULATED.3IONS-SCNC: 6 MMOL/L (ref 3–14)
AST SERPL W P-5'-P-CCNC: 25 U/L (ref 0–45)
BASOPHILS # BLD AUTO: 0 10E9/L (ref 0–0.2)
BASOPHILS NFR BLD AUTO: 0.5 %
BILIRUB SERPL-MCNC: 1.4 MG/DL (ref 0.2–1.3)
BUN SERPL-MCNC: 33 MG/DL (ref 7–30)
CALCIUM SERPL-MCNC: 9.3 MG/DL (ref 8.5–10.1)
CHLORIDE SERPL-SCNC: 109 MMOL/L (ref 94–109)
CO2 SERPL-SCNC: 24 MMOL/L (ref 20–32)
CREAT SERPL-MCNC: 1.95 MG/DL (ref 0.66–1.25)
DIFFERENTIAL METHOD BLD: ABNORMAL
EOSINOPHIL # BLD AUTO: 0.2 10E9/L (ref 0–0.7)
EOSINOPHIL NFR BLD AUTO: 2.1 %
ERYTHROCYTE [DISTWIDTH] IN BLOOD BY AUTOMATED COUNT: 14 % (ref 10–15)
GFR SERPL CREATININE-BSD FRML MDRD: 32 ML/MIN/{1.73_M2}
GLUCOSE SERPL-MCNC: 245 MG/DL (ref 70–99)
HCT VFR BLD AUTO: 43.2 % (ref 40–53)
HGB BLD-MCNC: 14.2 G/DL (ref 13.3–17.7)
IMM GRANULOCYTES # BLD: 0.1 10E9/L (ref 0–0.4)
IMM GRANULOCYTES NFR BLD: 0.8 %
LDH SERPL L TO P-CCNC: NORMAL U/L (ref 85–227)
LYMPHOCYTES # BLD AUTO: 1.3 10E9/L (ref 0.8–5.3)
LYMPHOCYTES NFR BLD AUTO: 16.4 %
MCH RBC QN AUTO: 30.9 PG (ref 26.5–33)
MCHC RBC AUTO-ENTMCNC: 32.9 G/DL (ref 31.5–36.5)
MCV RBC AUTO: 94 FL (ref 78–100)
MONOCYTES # BLD AUTO: 0.6 10E9/L (ref 0–1.3)
MONOCYTES NFR BLD AUTO: 7.6 %
NEUTROPHILS # BLD AUTO: 5.6 10E9/L (ref 1.6–8.3)
NEUTROPHILS NFR BLD AUTO: 72.6 %
NRBC # BLD AUTO: 0 10*3/UL
NRBC BLD AUTO-RTO: 0 /100
PLATELET # BLD AUTO: 135 10E9/L (ref 150–450)
POTASSIUM SERPL-SCNC: 4.8 MMOL/L (ref 3.4–5.3)
PROT SERPL-MCNC: 6.8 G/DL (ref 6.8–8.8)
RBC # BLD AUTO: 4.59 10E12/L (ref 4.4–5.9)
SODIUM SERPL-SCNC: 139 MMOL/L (ref 133–144)
WBC # BLD AUTO: 7.6 10E9/L (ref 4–11)

## 2019-10-07 PROCEDURE — 00000103 ZZHCL STATISTIC CYTO-FISH QC 88120: Performed by: INTERNAL MEDICINE

## 2019-10-07 PROCEDURE — 36415 COLL VENOUS BLD VENIPUNCTURE: CPT

## 2019-10-07 PROCEDURE — G0463 HOSPITAL OUTPT CLINIC VISIT: HCPCS

## 2019-10-07 PROCEDURE — 88112 CYTOPATH CELL ENHANCE TECH: CPT | Performed by: INTERNAL MEDICINE

## 2019-10-07 PROCEDURE — 85025 COMPLETE CBC W/AUTO DIFF WBC: CPT | Performed by: INTERNAL MEDICINE

## 2019-10-07 PROCEDURE — 99214 OFFICE O/P EST MOD 30 MIN: CPT | Performed by: INTERNAL MEDICINE

## 2019-10-07 PROCEDURE — 80053 COMPREHEN METABOLIC PANEL: CPT | Performed by: INTERNAL MEDICINE

## 2019-10-07 PROCEDURE — 74176 CT ABD & PELVIS W/O CONTRAST: CPT

## 2019-10-07 PROCEDURE — 88112 CYTOPATH CELL ENHANCE TECH: CPT | Mod: 26 | Performed by: INTERNAL MEDICINE

## 2019-10-07 PROCEDURE — 83615 LACTATE (LD) (LDH) ENZYME: CPT | Performed by: INTERNAL MEDICINE

## 2019-10-07 ASSESSMENT — PAIN SCALES - GENERAL: PAINLEVEL: NO PAIN (0)

## 2019-10-07 ASSESSMENT — MIFFLIN-ST. JEOR: SCORE: 1519.6

## 2019-10-07 NOTE — NURSING NOTE
"Oncology Rooming Note    October 7, 2019 10:58 AM   Baldo Landon is a 79 year old male who presents for:    Chief Complaint   Patient presents with     Oncology Clinic Visit     Bladder cancer      Initial Vitals: /71   Pulse 75   Temp 97  F (36.1  C) (Oral)   Resp 16   Ht 1.778 m (5' 10\")   Wt 78.8 kg (173 lb 12.8 oz)   SpO2 98%   BMI 24.94 kg/m   Estimated body mass index is 24.94 kg/m  as calculated from the following:    Height as of this encounter: 1.778 m (5' 10\").    Weight as of this encounter: 78.8 kg (173 lb 12.8 oz). Body surface area is 1.97 meters squared.  No Pain (0) Comment: Data Unavailable   No LMP for male patient.  Allergies reviewed: Yes  Medications reviewed: Yes    Medications: Medication refills not needed today.  Pharmacy name entered into [employer]:    [facility], [place] - [place], [place] - [address] [facility] - [place], [place] - [address] [id number] - [place], [place] - [address]    Clinical concerns: Follow up       Tamera Rodriguez CMA              "

## 2019-10-07 NOTE — PROGRESS NOTES
"[facility]  HEMATOLOGY ONCOLOGY     DIAGNOSES:     1.  Muscle invasive bladder cancer diagnosed 05/11/2015.    2.  History of prostate cancer.      TREATMENT:     1.  TURBT, 05/11/2015.    2.  Cycle 1 neoadjuvant dose dense MVAC, methotrexate, vinblastine, doxorubicin and cyclophosphamide 06/11/2015 with Neulasta support.  Cycle 2 06/24/2015. Cycle 3 7/8/15, Cycle 4 7/22/15.  3.  Cystoprostatectomy - 9/15/15    SUBJECTIVE:  Baldo Landon comes in for a followup today. He is feeling well.    He had a scan done prior to clinic visit and he is here to review findings.     REVIEW OF SYSTEMS:  A complete review of systems was performed and found to be negative other than pertinent positives mentioned in history of present illness.     Past medical, social histories reviewed.    Meds- Reviewed.     PHYSICAL EXAMINATION:   VITAL SIGNS:/71   Pulse 75   Temp 97  F (36.1  C) (Oral)   Resp 16   Ht 1.778 m (5' 10\")   Wt 78.8 kg (173 lb 12.8 oz)   SpO2 98%   BMI 24.94 kg/m    GENERAL: Sitting comfortably.   HEENT: Pupils are equal. Oropharynx has small area of mucositis.  NECK: No cervical or supraclavicular lymphadenopathy.   LUNGS: Clear bilaterally.   HEART: S1, S2, regular.   ABDOMEN: Soft, nontender, nondistended, no hepatosplenomegaly.   EXTREMITIES: Warm, well perfused.   NEUROLOGIC: Alert, awake.   SKIN: No rash.   LYMPHATICS: No edema.     DATA:   Recent Labs   Lab Test 10/07/19  0926 04/02/19  1230 09/25/18  1018 03/28/18  1158 09/12/17  1105    134 138 139 137   POTASSIUM 4.8 4.4 4.3 4.8 4.5   CHLORIDE 109 108 110* 110* 107   CO2 24 18* 20 21 23   ANIONGAP 6 8 8 8 7   BUN 33* 32* 42* 40* 39*   CR 1.95* 1.80* 2.11* 1.89* 1.79*   * 236* 209* 291* 235*   CARSON 9.3 9.2 8.3* 9.0 9.7     Recent Labs   Lab Test 09/28/15  0829 09/27/15  1032 09/26/15  0556 09/25/15  0705 09/24/15  0705 09/23/15  0645 09/22/15  0630   MAG 1.8 2.0 1.7 1.9 1.8 1.7  --    PHOS 3.3  --   --  2.8 2.8 2.6 " 4.0     Recent Labs   Lab Test 10/07/19  0926 04/02/19  1230 09/25/18  1018 03/28/18  1158 09/12/17  1105   WBC 7.6 8.2 6.1 6.4 8.2   HGB 14.2 14.8 13.4 12.8* 13.4   * 146* 125* 136* 191   MCV 94 94 95 93 93   NEUTROPHIL 72.6 73.9 76.4 70.2 77.8     Recent Labs   Lab Test 10/07/19  0926 04/02/19  1230 09/25/18  1018   BILITOTAL 1.4* 1.3 1.3   ALKPHOS 107 118 111   ALT 29 26 30   AST 25 20 18   ALBUMIN 3.4 3.5 3.5   LDH Canceled, Test credited 198 159     No results found for: TSH  No results for input(s): CEA in the last 00662 hours.  Results for orders placed or performed during the hospital encounter of 10/07/19   CT Chest Abdomen Pelvis w/o Contrast    Narrative    CT CHEST/ABDOMEN/PELVIS WITHOUT CONTRAST October 7, 2019 10:05 AM     HISTORY: Urothelial cancer. Malignant neoplasm of overlapping sites of  bladder (H).     COMPARISON: CT chest, abdomen and pelvis 4/2/2019.    TECHNIQUE: Axial images from the thoracic inlet to the symphysis are  performed with additional coronal reformatted images. No contrast is  utilized. Radiation dose for this scan was reduced using automated  exposure control, adjustment of the mA and/or kV according to patient  size, or iterative reconstruction technique.    FINDINGS:     Chest: Medial right middle lobe nodule along the pericardial border on  series 6, image 143 again measures approximately 0.5 cm is stable. A  few calcified granulomas are noted in the right and left lung. Mild  left major fissure nodules are likely fissural lymph nodes measuring  0.2 cm each on images 122 and 126. Lungs are otherwise clear. No new  or enlarging lung lesions.    No pleural or pericardial fluid. Heart is normal in size. Esophagus is  unremarkable. Thyroid gland appears normal in size where imaged. No  enlarged lymph nodes. Extensive calcified plaque is noted in the  aorta. No evidence of aneurysm. Scattered coronary artery  calcifications are also noted.    Abdomen: Calcified hepatic  artery aneurysm is again noted. Pneumobilia  and additional air in the gallbladder is noted. No calcified  gallstones. Allowing for the noncontrast technique, the liver, spleen,  pancreas and adrenal glands are within normal limits. Left kidney  demonstrates areas of cortical scarring. Tiny nonobstructing left  renal collecting system stone is stable on series 2, image 65. No  right urinary tract calculi or hydronephrosis. Dominant lower pole  right renal cyst measures 9 cm in diameter. This is water density  consistent with a simple cyst. No enlarged lymph nodes. Aorta  demonstrates calcified plaque along with its branch vessels. Bilateral  iliac artery ectasia is noted. The bowel is normal in caliber without  obstruction or diverticulitis.    Pelvis: Prior cystoprostatectomy. Radioactive seeds remain in the area  of previous prostate gland. Rectum is unremarkable. Right lower  quadrant urinary tract diversion with ostomy is again noted. No  interval change. Fat-containing right inguinal hernia is present. Bone  window examination demonstrates degenerative spine changes. No  destructive appearing bone lesions are noted.      Impression    IMPRESSION:  1. No evidence of recurrent or metastatic disease in the chest,  abdomen or pelvis. No enlarged lymph nodes. No new or enlarging lung  lesions.  2. Prior cystoprostatectomy with right lower quadrant urinary  diversion. No hydronephrosis. Tiny nonobstructing left renal  collecting system stone and right renal cyst are stable.  3. Pneumobilia and air within the gallbladder appears stable probably  related to previous sphincterotomy. No obvious biliary dilatation is  appreciated.  4. 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 Extensive calcified  plaque is noted throughout the aorta including scattered areas in the  coronary arteries.    JACQUELYN SHARMA MD     ASSESSMENT:    1. Muscle invasive bladder cancer - post dose dense MVAC ypT1N0 disease  2. CKD stage III;    3. Prostate cancer s/p xrt, TIA - 2010, A fib on warfarin, DM II    I have reviewed actual images from his recent staging CT scan. All his previous lung lesions are stable. Nothing to suggest recurrent disease based on his CT scans, labs or symptoms    His kidney function remains stable though his creatinine is slightly numerically higher from the last visit. He does have HTN and DM TII in addition to his CKD.    He is 4 years out from treatment completion. Failures this far out are extremely uncommon. I would suggest that we can get one more follow up imaging in a year. I have suggested that he get his labs, scans and urine exam - week to 10 days prior to clinic visit if feasible. Urine cytology and FISH are pending from today. He could follow this with Dr. Mann whom he meets later this week.     He had questions on his prognosis and life expectancy. It is likely that he is cured from his bladder cancer. He has a 1 year old grandson. He was wondering if he could see him graduate. It is quite possible that he could make it but he will have to ensure that he is still in good health and ambulatory as otherwise it would not be as much fun.     Over 25 min of direct face to face time spent with patient with more than 50% time spent in counseling and coordinating care.

## 2019-10-07 NOTE — LETTER
"    10/7/2019         RE: Baldo Landon  [address]  [place] [address]        Dear Colleague,    Thank you for referring your patient, Baldo Landon, to the [facility]. Please see a copy of my visit note below.    [facility]  HEMATOLOGY ONCOLOGY     DIAGNOSES:     1.  Muscle invasive bladder cancer diagnosed 05/11/2015.    2.  History of prostate cancer.      TREATMENT:     1.  TURBT, 05/11/2015.    2.  Cycle 1 neoadjuvant dose dense MVAC, methotrexate, vinblastine, doxorubicin and cyclophosphamide 06/11/2015 with Neulasta support.  Cycle 2 06/24/2015. Cycle 3 7/8/15, Cycle 4 7/22/15.  3.  Cystoprostatectomy - 9/15/15    SUBJECTIVE:  Baldo Landon comes in for a followup today. He is feeling well.    He had a scan done prior to clinic visit and he is here to review findings.     REVIEW OF SYSTEMS:  A complete review of systems was performed and found to be negative other than pertinent positives mentioned in history of present illness.     Past medical, social histories reviewed.    Meds- Reviewed.     PHYSICAL EXAMINATION:   VITAL SIGNS:/71   Pulse 75   Temp 97  F (36.1  C) (Oral)   Resp 16   Ht 1.778 m (5' 10\")   Wt 78.8 kg (173 lb 12.8 oz)   SpO2 98%   BMI 24.94 kg/m     GENERAL: Sitting comfortably.   HEENT: Pupils are equal. Oropharynx has small area of mucositis.  NECK: No cervical or supraclavicular lymphadenopathy.   LUNGS: Clear bilaterally.   HEART: S1, S2, regular.   ABDOMEN: Soft, nontender, nondistended, no hepatosplenomegaly.   EXTREMITIES: Warm, well perfused.   NEUROLOGIC: Alert, awake.   SKIN: No rash.   LYMPHATICS: No edema.     DATA:   Recent Labs   Lab Test 10/07/19  0926 04/02/19  1230 09/25/18  1018 03/28/18  1158 09/12/17  1105    134 138 139 137   POTASSIUM 4.8 4.4 4.3 4.8 4.5   CHLORIDE 109 108 110* 110* 107   CO2 24 18* 20 21 23   ANIONGAP 6 8 8 8 7   BUN 33* 32* 42* 40* 39*   CR 1.95* 1.80* 2.11* 1.89* 1.79*   * 236* 209* 291* 235* "   CARSON 9.3 9.2 8.3* 9.0 9.7     Recent Labs   Lab Test 09/28/15  0829 09/27/15  1032 09/26/15  0556 09/25/15  0705 09/24/15  0705 09/23/15  0645 09/22/15  0630   MAG 1.8 2.0 1.7 1.9 1.8 1.7  --    PHOS 3.3  --   --  2.8 2.8 2.6 4.0     Recent Labs   Lab Test 10/07/19  0926 04/02/19  1230 09/25/18  1018 03/28/18  1158 09/12/17  1105   WBC 7.6 8.2 6.1 6.4 8.2   HGB 14.2 14.8 13.4 12.8* 13.4   * 146* 125* 136* 191   MCV 94 94 95 93 93   NEUTROPHIL 72.6 73.9 76.4 70.2 77.8     Recent Labs   Lab Test 10/07/19  0926 04/02/19  1230 09/25/18  1018   BILITOTAL 1.4* 1.3 1.3   ALKPHOS 107 118 111   ALT 29 26 30   AST 25 20 18   ALBUMIN 3.4 3.5 3.5   LDH Canceled, Test credited 198 159     No results found for: TSH  No results for input(s): CEA in the last 12966 hours.  Results for orders placed or performed during the hospital encounter of 10/07/19   CT Chest Abdomen Pelvis w/o Contrast    Narrative    CT CHEST/ABDOMEN/PELVIS WITHOUT CONTRAST October 7, 2019 10:05 AM     HISTORY: Urothelial cancer. Malignant neoplasm of overlapping sites of  bladder (H).     COMPARISON: CT chest, abdomen and pelvis 4/2/2019.    TECHNIQUE: Axial images from the thoracic inlet to the symphysis are  performed with additional coronal reformatted images. No contrast is  utilized. Radiation dose for this scan was reduced using automated  exposure control, adjustment of the mA and/or kV according to patient  size, or iterative reconstruction technique.    FINDINGS:     Chest: Medial right middle lobe nodule along the pericardial border on  series 6, image 143 again measures approximately 0.5 cm is stable. A  few calcified granulomas are noted in the right and left lung. Mild  left major fissure nodules are likely fissural lymph nodes measuring  0.2 cm each on images 122 and 126. Lungs are otherwise clear. No new  or enlarging lung lesions.    No pleural or pericardial fluid. Heart is normal in size. Esophagus is  unremarkable. Thyroid gland  appears normal in size where imaged. No  enlarged lymph nodes. Extensive calcified plaque is noted in the  aorta. No evidence of aneurysm. Scattered coronary artery  calcifications are also noted.    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 Pneumobilia  and additional air in the gallbladder is noted. No calcified  gallstones. Allowing for the noncontrast technique, the liver, spleen,  pancreas and adrenal glands are within normal limits. Left kidney  demonstrates areas of cortical scarring. Tiny nonobstructing left  renal collecting system stone is stable on series 2, image 65. No  right urinary tract calculi or hydronephrosis. Dominant lower pole  right renal cyst measures 9 cm in diameter. This is water density  consistent with a simple cyst. No enlarged lymph nodes. Aorta  demonstrates calcified plaque along with its branch vessels. Bilateral  iliac artery ectasia is noted. The bowel is normal in caliber without  obstruction or diverticulitis.    Pelvis: Prior cystoprostatectomy. Radioactive seeds remain in the area  of previous prostate gland. Rectum is unremarkable. Right lower  quadrant urinary tract diversion with ostomy is again noted. No  interval change. Fat-containing right inguinal hernia is present. Bone  window examination demonstrates degenerative spine changes. No  destructive appearing bone lesions are noted.      Impression    IMPRESSION:  1. No evidence of recurrent or metastatic disease in the chest,  abdomen or pelvis. No enlarged lymph nodes. No new or enlarging lung  lesions.  2. Prior cystoprostatectomy with right lower quadrant urinary  diversion. No hydronephrosis. Tiny nonobstructing left renal  collecting system stone and right renal cyst are stable.  3. Pneumobilia and air within the gallbladder appears stable probably  related to previous sphincterotomy. No obvious biliary dilatation is  appreciated.  4. 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 Extensive  calcified  plaque is noted throughout the aorta including scattered areas in the  coronary arteries.    JACQUELYN SHARMA MD     ASSESSMENT:    1. Muscle invasive bladder cancer - post dose dense MVAC ypT1N0 disease  2. CKD stage III;   3. Prostate cancer s/p xrt, TIA - 2010, A fib on warfarin, DM II    I have reviewed actual images from his recent staging CT scan. All his previous lung lesions are stable. Nothing to suggest recurrent disease based on his CT scans, labs or symptoms    His kidney function remains stable though his creatinine is slightly numerically higher from the last visit. He does have HTN and DM TII in addition to his CKD.    He is 4 years out from treatment completion. Failures this far out are extremely uncommon. I would suggest that we can get one more follow up imaging in a year. I have suggested that he get his labs, scans and urine exam - week to 10 days prior to clinic visit if feasible. Urine cytology and FISH are pending from today. He could follow this with Dr. Mann whom he meets later this week.     He had questions on his prognosis and life expectancy. It is likely that he is cured from his bladder cancer. He has a 1 year old grandson. He was wondering if he could see him graduate. It is quite possible that he could make it but he will have to ensure that he is still in good health and ambulatory as otherwise it would not be as much fun.     Over 25 min of direct face to face time spent with patient with more than 50% time spent in counseling and coordinating care.        Again, thank you for allowing me to participate in the care of your patient.        Sincerely,        Bradford Pires MD

## 2019-10-09 ENCOUNTER — ONCOLOGY VISIT (OUTPATIENT)
Dept: ONCOLOGY | Facility: CLINIC | Age: 79
End: 2019-10-09
Attending: UROLOGY
Payer: MEDICARE

## 2019-10-09 VITALS
WEIGHT: 175.13 LBS | HEIGHT: 70 IN | OXYGEN SATURATION: 97 % | BODY MASS INDEX: 25.07 KG/M2 | DIASTOLIC BLOOD PRESSURE: 71 MMHG | RESPIRATION RATE: 16 BRPM | HEART RATE: 73 BPM | TEMPERATURE: 97 F | SYSTOLIC BLOOD PRESSURE: 111 MMHG

## 2019-10-09 DIAGNOSIS — K43.5 PARASTOMAL HERNIA WITHOUT OBSTRUCTION OR GANGRENE: ICD-10-CM

## 2019-10-09 DIAGNOSIS — C67.8 MALIGNANT NEOPLASM OF OVERLAPPING SITES OF BLADDER (H): Primary | ICD-10-CM

## 2019-10-09 DIAGNOSIS — Z98.890 HISTORY OF ILEAL CONDUIT: ICD-10-CM

## 2019-10-09 PROCEDURE — 99213 OFFICE O/P EST LOW 20 MIN: CPT | Performed by: UROLOGY

## 2019-10-09 PROCEDURE — G0463 HOSPITAL OUTPT CLINIC VISIT: HCPCS

## 2019-10-09 ASSESSMENT — PAIN SCALES - GENERAL: PAINLEVEL: NO PAIN (0)

## 2019-10-09 ASSESSMENT — MIFFLIN-ST. JEOR: SCORE: 1525.61

## 2019-10-09 NOTE — NURSING NOTE
"Oncology Rooming Note    October 9, 2019 9:46 AM   Baldo Landon is a 79 year old male who presents for:    Chief Complaint   Patient presents with     Oncology Clinic Visit     Bladder cancer     Initial Vitals: /71   Pulse 73   Temp 97  F (36.1  C) (Tympanic)   Resp 16   Ht 1.778 m (5' 10\")   Wt 79.4 kg (175 lb 2 oz)   SpO2 97%   BMI 25.13 kg/m   Estimated body mass index is 25.13 kg/m  as calculated from the following:    Height as of this encounter: 1.778 m (5' 10\").    Weight as of this encounter: 79.4 kg (175 lb 2 oz). Body surface area is 1.98 meters squared.  No Pain (0) Comment: Data Unavailable   No LMP for male patient.  Allergies reviewed: Yes  Medications reviewed: Yes    Medications: Medication refills not needed today.  Pharmacy name entered into [employer]:    [place], [place] - [address] [facility] - [place], [place] - [address] [id number] - [place], [place] - [address]    Clinical concerns: follow up      Steph Green CMA              "

## 2019-10-09 NOTE — PROGRESS NOTES
"  CHIEF COMPLAINT   It was my pleasure to see Baldo Landon who is a 79 year old male for follow-up of bladder cancer.      HPI  Mr Landon is a pleasant 79 year old man here with history of a cystoprostatectomy performed on 9/15/2015 with a post-op course complicated with a-fib, prolonged ileus. Has parastomal hernia, but now managed well with an abdominal binder. Has seen Dr. Nowak and not planning surgical intervention. Overall doing well. No hematuria, abd pain or flank pain.     Neoadjuvant chemotherapy [facility]  TMN Stage: [id number], Original stage was T2  Number of nodes removed: 17  Number of positive nodes: 0  Surgical Margins : negative  Grade: high  Histology: urothelial without secondary histology  Surgery: Radical Cx and Ileal conduit 9/15     CT chest/abd/pelvis 10/7/2019  IMPRESSION:  1. No evidence of recurrent or metastatic disease in the chest,  abdomen or pelvis. No enlarged lymph nodes. No new or enlarging lung  lesions.  2. Prior cystoprostatectomy with right lower quadrant urinary  diversion. No hydronephrosis. Tiny nonobstructing left renal  collecting system stone and right renal cyst are stable.  3. Pneumobilia and air within the gallbladder appears stable probably  related to previous sphincterotomy. No obvious biliary dilatation is  appreciated.  4. 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 Extensive calcified  plaque is noted throughout the aorta including scattered areas in the  coronary arteries.    PHYSICAL EXAM  Patient is a 79 year old  male   Vitals: Blood pressure 111/71, pulse 73, temperature 97  F (36.1  C), temperature source Tympanic, resp. rate 16, height 1.778 m (5' 10\"), weight 79.4 kg (175 lb 2 oz), SpO2 97 %.  General Appearance Adult: Body mass index is 25.13 kg/m .  Alert, no acute distress, oriented  HENT: throat/mouth:normal, good dentition  Lungs: no respiratory distress, or pursed lip breathing  Heart: No obvious jugular venous distension present  Abdomen: soft, " nontender, no organomegaly or masses;   mild parastomal hernia (abd binder in place)  Stoma pink and healthy; clear urine  Back: no CVAT  Musculoskeltal: extremities normal, no peripheral edema  Skin: no suspicious lesions or rashes  Neuro: Alert, oriented, speech and mentation normal  Psych: affect and mood normal  Gait: Normal    ASSESSMENT and PLAN  79 year old male with history of pT2(T1 after MVAC)N0M0R0 urothelial cancer with no evidence of disease s/p Rad Cx 9/15. Doing well. Parastomal hernia managed well. Creatinine stable.     Plan:  Imaging and labs per Dr. Pires  Follow-up 12 months with clinic visit and cytology     I spent over 15 minutes with the patient.  Over half this time was spent on counseling regarding bladder cancer.    Stoney Mann MD  Urology  [facility]  Clinic Phone [phone]

## 2019-10-14 LAB — COPATH REPORT: NORMAL

## 2019-10-15 LAB — COPATH REPORT: NORMAL

## 2019-10-17 ENCOUNTER — HOSPITAL ENCOUNTER (OUTPATIENT)
Facility: CLINIC | Age: 79
Setting detail: SPECIMEN
End: 2019-10-17
Attending: INTERNAL MEDICINE
Payer: MEDICARE

## 2020-03-10 ENCOUNTER — HEALTH MAINTENANCE LETTER (OUTPATIENT)
Age: 80
End: 2020-03-10

## 2020-08-18 ENCOUNTER — TELEPHONE (OUTPATIENT)
Dept: ONCOLOGY | Facility: CLINIC | Age: 80
End: 2020-08-18

## 2020-08-18 NOTE — TELEPHONE ENCOUNTER
Patients phone connection was not clear, they will call back to reschedule. Notified patient they can do a telephone or video visit rather then coming into the clinic. Rescheduling due to Dr. Mann not seeing patients at [place]. Reschedule to [facility] October or November

## 2020-10-07 ENCOUNTER — HOSPITAL ENCOUNTER (OUTPATIENT)
Facility: CLINIC | Age: 80
Setting detail: SPECIMEN
Discharge: HOME | End: 2020-10-07
Attending: INTERNAL MEDICINE | Admitting: INTERNAL MEDICINE
Payer: MEDICARE

## 2020-10-07 ENCOUNTER — HOSPITAL ENCOUNTER (OUTPATIENT)
Facility: CLINIC | Age: 80
Setting detail: SPECIMEN
End: 2020-10-07
Attending: INTERNAL MEDICINE
Payer: MEDICARE

## 2020-10-07 ENCOUNTER — HOSPITAL ENCOUNTER (OUTPATIENT)
Dept: CT IMAGING | Facility: CLINIC | Age: 80
Discharge: HOME | End: 2020-10-07
Attending: INTERNAL MEDICINE | Admitting: INTERNAL MEDICINE
Payer: MEDICARE

## 2020-10-07 DIAGNOSIS — C67.8 MALIGNANT NEOPLASM OF OVERLAPPING SITES OF BLADDER (H): ICD-10-CM

## 2020-10-07 LAB
ALBUMIN SERPL-MCNC: 3.4 G/DL (ref 3.4–5)
ALP SERPL-CCNC: 89 U/L (ref 40–150)
ALT SERPL W P-5'-P-CCNC: 22 U/L (ref 0–70)
ANION GAP SERPL CALCULATED.3IONS-SCNC: 6 MMOL/L (ref 3–14)
AST SERPL W P-5'-P-CCNC: 15 U/L (ref 0–45)
BASOPHILS # BLD AUTO: 0 10E9/L (ref 0–0.2)
BASOPHILS NFR BLD AUTO: 0.3 %
BILIRUB SERPL-MCNC: 0.8 MG/DL (ref 0.2–1.3)
BUN SERPL-MCNC: 53 MG/DL (ref 7–30)
CALCIUM SERPL-MCNC: 9.2 MG/DL (ref 8.5–10.1)
CHLORIDE SERPL-SCNC: 110 MMOL/L (ref 94–109)
CO2 SERPL-SCNC: 20 MMOL/L (ref 20–32)
CREAT SERPL-MCNC: 2.18 MG/DL (ref 0.66–1.25)
DIFFERENTIAL METHOD BLD: NORMAL
EOSINOPHIL # BLD AUTO: 0.1 10E9/L (ref 0–0.7)
EOSINOPHIL NFR BLD AUTO: 1.1 %
ERYTHROCYTE [DISTWIDTH] IN BLOOD BY AUTOMATED COUNT: 13.9 % (ref 10–15)
GFR SERPL CREATININE-BSD FRML MDRD: 28 ML/MIN/{1.73_M2}
GLUCOSE SERPL-MCNC: 314 MG/DL (ref 70–99)
HCT VFR BLD AUTO: 42.9 % (ref 40–53)
HGB BLD-MCNC: 13.5 G/DL (ref 13.3–17.7)
IMM GRANULOCYTES # BLD: 0.1 10E9/L (ref 0–0.4)
IMM GRANULOCYTES NFR BLD: 0.9 %
LDH SERPL L TO P-CCNC: 144 U/L (ref 85–227)
LYMPHOCYTES # BLD AUTO: 1.1 10E9/L (ref 0.8–5.3)
LYMPHOCYTES NFR BLD AUTO: 11.8 %
MCH RBC QN AUTO: 30.5 PG (ref 26.5–33)
MCHC RBC AUTO-ENTMCNC: 31.5 G/DL (ref 31.5–36.5)
MCV RBC AUTO: 97 FL (ref 78–100)
MONOCYTES # BLD AUTO: 0.7 10E9/L (ref 0–1.3)
MONOCYTES NFR BLD AUTO: 7.7 %
NEUTROPHILS # BLD AUTO: 7.1 10E9/L (ref 1.6–8.3)
NEUTROPHILS NFR BLD AUTO: 78.2 %
NRBC # BLD AUTO: 0 10*3/UL
NRBC BLD AUTO-RTO: 0 /100
PLATELET # BLD AUTO: 151 10E9/L (ref 150–450)
POTASSIUM SERPL-SCNC: 4.9 MMOL/L (ref 3.4–5.3)
PROT SERPL-MCNC: 7.2 G/DL (ref 6.8–8.8)
RBC # BLD AUTO: 4.43 10E12/L (ref 4.4–5.9)
SODIUM SERPL-SCNC: 136 MMOL/L (ref 133–144)
WBC # BLD AUTO: 9 10E9/L (ref 4–11)

## 2020-10-07 PROCEDURE — 88112 CYTOPATH CELL ENHANCE TECH: CPT | Mod: TC | Performed by: INTERNAL MEDICINE

## 2020-10-07 PROCEDURE — 36415 COLL VENOUS BLD VENIPUNCTURE: CPT

## 2020-10-07 PROCEDURE — 88112 CYTOPATH CELL ENHANCE TECH: CPT | Mod: 26

## 2020-10-07 PROCEDURE — 99207 PR NO CHARGE LOS: CPT

## 2020-10-07 PROCEDURE — 999N001015 HC STATISTIC CYTO-FISH QC 88120: Performed by: INTERNAL MEDICINE

## 2020-10-07 PROCEDURE — 83615 LACTATE (LD) (LDH) ENZYME: CPT | Performed by: INTERNAL MEDICINE

## 2020-10-07 PROCEDURE — 85025 COMPLETE CBC W/AUTO DIFF WBC: CPT | Performed by: INTERNAL MEDICINE

## 2020-10-07 PROCEDURE — 80053 COMPREHEN METABOLIC PANEL: CPT | Performed by: INTERNAL MEDICINE

## 2020-10-07 PROCEDURE — 74176 CT ABD & PELVIS W/O CONTRAST: CPT

## 2020-10-09 ENCOUNTER — VIRTUAL VISIT (OUTPATIENT)
Dept: UROLOGY | Facility: CLINIC | Age: 80
End: 2020-10-09
Payer: COMMERCIAL

## 2020-10-09 ENCOUNTER — VIRTUAL VISIT (OUTPATIENT)
Dept: ONCOLOGY | Facility: CLINIC | Age: 80
End: 2020-10-09
Attending: INTERNAL MEDICINE
Payer: COMMERCIAL

## 2020-10-09 VITALS — BODY MASS INDEX: 22.96 KG/M2 | WEIGHT: 164 LBS | HEIGHT: 71 IN

## 2020-10-09 DIAGNOSIS — K43.5 PARASTOMAL HERNIA WITHOUT OBSTRUCTION OR GANGRENE: ICD-10-CM

## 2020-10-09 DIAGNOSIS — C67.8 MALIGNANT NEOPLASM OF OVERLAPPING SITES OF BLADDER (H): Primary | ICD-10-CM

## 2020-10-09 DIAGNOSIS — Z98.890 HISTORY OF ILEAL CONDUIT: ICD-10-CM

## 2020-10-09 DIAGNOSIS — N18.4 CKD (CHRONIC KIDNEY DISEASE) STAGE 4, GFR 15-29 ML/MIN (H): ICD-10-CM

## 2020-10-09 LAB
COPATH REPORT: NORMAL
COPATH REPORT: NORMAL

## 2020-10-09 PROCEDURE — 999N001193 HC VIDEO/TELEPHONE VISIT; NO CHARGE

## 2020-10-09 PROCEDURE — 99212 OFFICE O/P EST SF 10 MIN: CPT | Mod: 95 | Performed by: UROLOGY

## 2020-10-09 PROCEDURE — 99213 OFFICE O/P EST LOW 20 MIN: CPT | Mod: 95 | Performed by: INTERNAL MEDICINE

## 2020-10-09 ASSESSMENT — MIFFLIN-ST. JEOR: SCORE: 1486.03

## 2020-10-09 ASSESSMENT — PAIN SCALES - GENERAL: PAINLEVEL: NO PAIN (0)

## 2020-10-09 NOTE — LETTER
"    10/9/2020         RE: Baldo Landon  [address]  [place] [address]        Dear Colleague,    Thank you for referring your patient, Baldo Landon, to the [facility]. Please see a copy of my visit note below.    Baldo Landon is a 80 year old male who is being evaluated via a billable telephone visit.      The patient has been notified of following:     \"This telephone visit will be conducted via a call between you and your physician/provider. We have found that certain health care needs can be provided without the need for a physical exam.  This service lets us provide the care you need with a short phone conversation.  If a prescription is necessary we can send it directly to your pharmacy.  If lab work is needed we can place an order for that and you can then stop by our lab to have the test done at a later time.    Telephone visits are billed at different rates depending on your insurance coverage. During this emergency period, for some insurers they may be billed the same as an in-person visit.  Please reach out to your insurance provider with any questions.    If during the course of the call the physician/provider feels a telephone visit is not appropriate, you will not be charged for this service.\"    Patient has given verbal consent for Telephone visit?  Yes    What phone number would you like to be contacted at? [phone]    How would you like to obtain your AVS? Lisbeth Bobby CMA on 10/9/2020 at 2:41 PM        [facility]  HEMATOLOGY ONCOLOGY     DIAGNOSES:     1.  Muscle invasive bladder cancer diagnosed 05/11/2015.    2.  History of prostate cancer.      TREATMENT:     1.  TURBT, 05/11/2015.    2.  Cycle 1 neoadjuvant dose dense MVAC, methotrexate, vinblastine, doxorubicin and cyclophosphamide 06/11/2015 with Neulasta support.  Cycle 2 06/24/2015. Cycle 3 7/8/15, Cycle 4 7/22/15.  3.  Cystoprostatectomy - " 9/15/15    SUBJECTIVE:    Baldo Landon was reached over telephone for this telemedicine visit due to restrictions posed by COVID 19. He is feeling well.    He had a scan done prior to clinic visit and he is being followed to review findings.     REVIEW OF SYSTEMS:  A complete review of systems was performed and found to be negative other than pertinent positives mentioned in history of present illness.     Past medical, social histories reviewed.    Meds- Reviewed.     PHYSICAL EXAMINATION:   VITAL SIGNS:There were no vitals taken for this visit.  Physical exam not done at this telephone telemedicine visit      DATA:   Recent Labs   Lab Test 10/07/20  1019 10/07/19  0926 04/02/19  1230 09/25/18  1018 03/28/18  1158    139 134 138 139   POTASSIUM 4.9 4.8 4.4 4.3 4.8   CHLORIDE 110* 109 108 110* 110*   CO2 20 24 18* 20 21   ANIONGAP 6 6 8 8 8   BUN 53* 33* 32* 42* 40*   CR 2.18* 1.95* 1.80* 2.11* 1.89*   * 245* 236* 209* 291*   CARSON 9.2 9.3 9.2 8.3* 9.0     Recent Labs   Lab Test 09/28/15  0829 09/27/15  1032 09/26/15  0556 09/25/15  0705 09/24/15  0705 09/23/15  0645 09/22/15  0630   MAG 1.8 2.0 1.7 1.9 1.8 1.7  --    PHOS 3.3  --   --  2.8 2.8 2.6 4.0     Recent Labs   Lab Test 10/07/20  1019 10/07/19  0926 04/02/19  1230 09/25/18  1018 03/28/18  1158   WBC 9.0 7.6 8.2 6.1 6.4   HGB 13.5 14.2 14.8 13.4 12.8*    135* 146* 125* 136*   MCV 97 94 94 95 93   NEUTROPHIL 78.2 72.6 73.9 76.4 70.2     Recent Labs   Lab Test 10/07/20  1019 10/07/19  0926 04/02/19  1230   BILITOTAL 0.8 1.4* 1.3   ALKPHOS 89 107 118   ALT 22 29 26   AST 15 25 20   ALBUMIN 3.4 3.4 3.5     198     No results found for: TSH  No results for input(s): CEA in the last 37226 hours.  Results for orders placed or performed during the hospital encounter of 10/07/20   CT Chest Abdomen Pelvis w/o Contrast    Narrative    CT CHEST, ABDOMEN AND PELVIS WITHOUT CONTRAST 10/7/2020 11:09 AM    CLINICAL HISTORY: Bladder cancer -  surveillance after  cystoprostatectomy in October, 2015. Malignant neoplasm of overlapping  sites of bladder (H).    TECHNIQUE: CT scan of the chest, abdomen, and pelvis was performed  without IV contrast. Multiplanar reformats were obtained. Dose  reduction techniques were used.   CONTRAST: None.    COMPARISON: 10/7/2019    FINDINGS:   LUNGS AND PLEURA: Medial pleural-based nodule in the right upper lobe  (series 12, image 149) is unchanged. Other tiny pulmonary nodules  measuring less than 3 mm are also unchanged. No new pulmonary nodule,  pneumothorax or pleural effusion.    MEDIASTINUM/AXILLAE: Ascending thoracic aortic diameter upper normal  at 4.0 cm. There is mild coronary atherosclerosis. No mediastinal,  hilar, or axillary lymphadenopathy.    HEPATOBILIARY: Pneumobilia is present. No focal liver lesions within  the limits of a noncontrast study. 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    PANCREAS: Normal.    SPLEEN: Normal.    ADRENAL GLANDS: Normal.    KIDNEYS/BLADDER: Large right renal cyst is unchanged and requires no  specific follow-up. There are 2 nonobstructing calcifications at the  lower pole of the left kidney, the largest of which measures 2 mm. No  hydronephrosis on either side. There has been cystoprostatectomy with  a urinary diversion to the right lower quadrant.    BOWEL: Normal.    LYMPH NODES: Normal.    VASCULATURE: Unremarkable.    PELVIC ORGANS: Cystoprostatectomy. Radiation seeds noted in the  prostate bed.    OTHER: Aortic and iliac artery calcifications again noted, unchanged.    MUSCULOSKELETAL: Unremarkable.      Impression    IMPRESSION:  1.  No evidence of recurrent or metastatic disease in the chest,  abdomen, or pelvis.  2.  Small nonobstructing calcifications in the lower pole of the left  kidney have increased in size compared to prior.  3.  Pneumobilia is a chronic finding and likely related to prior  sphincterotomy.  4.  Vascular calcifications including a  calcified hepatic artery  aneurysm are unchanged.    OSMAN ROCHA MD         ASSESSMENT:    1. Muscle invasive bladder cancer - post dose dense MVAC ypT1N0 disease  2. CKD stage III;   3. Prostate cancer s/p xrt, TIA - 2010, A fib on warfarin, DM II    I have reviewed actual images from his recent staging CT scan. All his previous lung lesions are stable. Nothing to suggest recurrent disease based on his CT scans, labs or symptoms    His kidney function remains stable though his creatinine is slightly numerically higher from the last visit. He does have HTN and DM TII in addition to his CKD. He follows with nephrologist at the [facility]. I encouraged him to keep up with those visits as he does have stage IV chronic kidney disease now.     He is 5 years out from treatment completion. Failures this far out are extremely uncommon. It is more likely to have a different primary tumor. I do not feel that he would benefit from scheduled surveillance scans.     I will not schedule any more follow up visits at this time.       Phone call duration: 13 minutes    Bradford Pires    Hematologist and Medical Oncologist   [facility]            Again, thank you for allowing me to participate in the care of your patient.        Sincerely,        Bradford Pires MD

## 2020-10-09 NOTE — LETTER
"    10/9/2020         RE: Baldo Landon  [address]  [place] [address]        Dear Colleague,    Thank you for referring your patient, Baldo Landon, to the [facility]. Please see a copy of my visit note below.    Baldo Landon is a 80 year old male who is being evaluated via a billable telephone visit.      The patient has been notified of following:     \"This telephone visit will be conducted via a call between you and your physician/provider. We have found that certain health care needs can be provided without the need for a physical exam.  This service lets us provide the care you need with a short phone conversation.  If a prescription is necessary we can send it directly to your pharmacy.  If lab work is needed we can place an order for that and you can then stop by our lab to have the test done at a later time.    Telephone visits are billed at different rates depending on your insurance coverage. During this emergency period, for some insurers they may be billed the same as an in-person visit.  Please reach out to your insurance provider with any questions.    If during the course of the call the physician/provider feels a telephone visit is not appropriate, you will not be charged for this service.\"    Patient has given verbal consent for Telephone visit?  Yes    What phone number would you like to be contacted at? [phone]    How would you like to obtain your AVS? Lisbeth Bobby CMA on 10/9/2020 at 2:41 PM        [facility]  HEMATOLOGY ONCOLOGY     DIAGNOSES:     1.  Muscle invasive bladder cancer diagnosed 05/11/2015.    2.  History of prostate cancer.      TREATMENT:     1.  TURBT, 05/11/2015.    2.  Cycle 1 neoadjuvant dose dense MVAC, methotrexate, vinblastine, doxorubicin and cyclophosphamide 06/11/2015 with Neulasta support.  Cycle 2 06/24/2015. Cycle 3 7/8/15, Cycle 4 7/22/15.  3.  Cystoprostatectomy - " 9/15/15    SUBJECTIVE:    Baldo Landon was reached over telephone for this telemedicine visit due to restrictions posed by COVID 19. He is feeling well.    He had a scan done prior to clinic visit and he is being followed to review findings.     REVIEW OF SYSTEMS:  A complete review of systems was performed and found to be negative other than pertinent positives mentioned in history of present illness.     Past medical, social histories reviewed.    Meds- Reviewed.     PHYSICAL EXAMINATION:   VITAL SIGNS:There were no vitals taken for this visit.  Physical exam not done at this telephone telemedicine visit      DATA:   Recent Labs   Lab Test 10/07/20  1019 10/07/19  0926 04/02/19  1230 09/25/18  1018 03/28/18  1158    139 134 138 139   POTASSIUM 4.9 4.8 4.4 4.3 4.8   CHLORIDE 110* 109 108 110* 110*   CO2 20 24 18* 20 21   ANIONGAP 6 6 8 8 8   BUN 53* 33* 32* 42* 40*   CR 2.18* 1.95* 1.80* 2.11* 1.89*   * 245* 236* 209* 291*   CARSON 9.2 9.3 9.2 8.3* 9.0     Recent Labs   Lab Test 09/28/15  0829 09/27/15  1032 09/26/15  0556 09/25/15  0705 09/24/15  0705 09/23/15  0645 09/22/15  0630   MAG 1.8 2.0 1.7 1.9 1.8 1.7  --    PHOS 3.3  --   --  2.8 2.8 2.6 4.0     Recent Labs   Lab Test 10/07/20  1019 10/07/19  0926 04/02/19  1230 09/25/18  1018 03/28/18  1158   WBC 9.0 7.6 8.2 6.1 6.4   HGB 13.5 14.2 14.8 13.4 12.8*    135* 146* 125* 136*   MCV 97 94 94 95 93   NEUTROPHIL 78.2 72.6 73.9 76.4 70.2     Recent Labs   Lab Test 10/07/20  1019 10/07/19  0926 04/02/19  1230   BILITOTAL 0.8 1.4* 1.3   ALKPHOS 89 107 118   ALT 22 29 26   AST 15 25 20   ALBUMIN 3.4 3.4 3.5     198     No results found for: TSH  No results for input(s): CEA in the last 15693 hours.  Results for orders placed or performed during the hospital encounter of 10/07/20   CT Chest Abdomen Pelvis w/o Contrast    Narrative    CT CHEST, ABDOMEN AND PELVIS WITHOUT CONTRAST 10/7/2020 11:09 AM    CLINICAL HISTORY: Bladder cancer -  surveillance after  cystoprostatectomy in October, 2015. Malignant neoplasm of overlapping  sites of bladder (H).    TECHNIQUE: CT scan of the chest, abdomen, and pelvis was performed  without IV contrast. Multiplanar reformats were obtained. Dose  reduction techniques were used.   CONTRAST: None.    COMPARISON: 10/7/2019    FINDINGS:   LUNGS AND PLEURA: Medial pleural-based nodule in the right upper lobe  (series 12, image 149) is unchanged. Other tiny pulmonary nodules  measuring less than 3 mm are also unchanged. No new pulmonary nodule,  pneumothorax or pleural effusion.    MEDIASTINUM/AXILLAE: Ascending thoracic aortic diameter upper normal  at 4.0 cm. There is mild coronary atherosclerosis. No mediastinal,  hilar, or axillary lymphadenopathy.    HEPATOBILIARY: Pneumobilia is present. No focal liver lesions within  the limits of a noncontrast study. 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    PANCREAS: Normal.    SPLEEN: Normal.    ADRENAL GLANDS: Normal.    KIDNEYS/BLADDER: Large right renal cyst is unchanged and requires no  specific follow-up. There are 2 nonobstructing calcifications at the  lower pole of the left kidney, the largest of which measures 2 mm. No  hydronephrosis on either side. There has been cystoprostatectomy with  a urinary diversion to the right lower quadrant.    BOWEL: Normal.    LYMPH NODES: Normal.    VASCULATURE: Unremarkable.    PELVIC ORGANS: Cystoprostatectomy. Radiation seeds noted in the  prostate bed.    OTHER: Aortic and iliac artery calcifications again noted, unchanged.    MUSCULOSKELETAL: Unremarkable.      Impression    IMPRESSION:  1.  No evidence of recurrent or metastatic disease in the chest,  abdomen, or pelvis.  2.  Small nonobstructing calcifications in the lower pole of the left  kidney have increased in size compared to prior.  3.  Pneumobilia is a chronic finding and likely related to prior  sphincterotomy.  4.  Vascular calcifications including a  calcified hepatic artery  aneurysm are unchanged.    OSMAN ROCHA MD         ASSESSMENT:    1. Muscle invasive bladder cancer - post dose dense MVAC ypT1N0 disease  2. CKD stage III;   3. Prostate cancer s/p xrt, TIA - 2010, A fib on warfarin, DM II    I have reviewed actual images from his recent staging CT scan. All his previous lung lesions are stable. Nothing to suggest recurrent disease based on his CT scans, labs or symptoms    His kidney function remains stable though his creatinine is slightly numerically higher from the last visit. He does have HTN and DM TII in addition to his CKD. He follows with nephrologist at the [facility]. I encouraged him to keep up with those visits as he does have stage IV chronic kidney disease now.     He is 5 years out from treatment completion. Failures this far out are extremely uncommon. It is more likely to have a different primary tumor. I do not feel that he would benefit from scheduled surveillance scans.     I will not schedule any more follow up visits at this time.       Phone call duration: 13 minutes    Bradford Pires    Hematologist and Medical Oncologist   [facility]            Again, thank you for allowing me to participate in the care of your patient.        Sincerely,        Bradford Pires MD

## 2020-10-09 NOTE — PROGRESS NOTES
"Baldo Landon is a 80 year old male who is being evaluated via a billable telephone visit.      The patient has been notified of following:     \"This telephone visit will be conducted via a call between you and your physician/provider. We have found that certain health care needs can be provided without the need for a physical exam.  This service lets us provide the care you need with a short phone conversation.  If a prescription is necessary we can send it directly to your pharmacy.  If lab work is needed we can place an order for that and you can then stop by our lab to have the test done at a later time.    Telephone visits are billed at different rates depending on your insurance coverage. During this emergency period, for some insurers they may be billed the same as an in-person visit.  Please reach out to your insurance provider with any questions.    If during the course of the call the physician/provider feels a telephone visit is not appropriate, you will not be charged for this service.\"    Patient has given verbal consent for Telephone visit?  Yes    What phone number would you like to be contacted at? [phone]    How would you like to obtain your AVS? MyChart    Chief Complaint   It was my pleasure to speak with Baldo Landon who is a 80 year old male for follow-up of bladder cancer.      HPI  Mr Landon is a pleasant 80 year old man here with history of a cystoprostatectomy performed on 9/15/2015 with a post-op course complicated with a-fib, prolonged ileus. Has parastomal hernia, but now managed well with an abdominal binder. Has seen Dr. Nowak and not planning surgical intervention. Overall doing well. No hematuria, abd pain or flank pain.     Neoadjuvant chemotherapy [facility]  TMN Stage: [id number], Original stage was T2  Number of nodes removed: 17  Number of positive nodes: 0  Surgical Margins : negative  Grade: high  Histology: urothelial without secondary histology  Surgery: Radical Cx and Ileal " conduit 9/15     CT chest/abd/pelvis 10/7/2020  IMPRESSION:  1.  No evidence of recurrent or metastatic disease in the chest,  abdomen, or pelvis.  2.  Small nonobstructing calcifications in the lower pole of the left  kidney have increased in size compared to prior.  3.  Pneumobilia is a chronic finding and likely related to prior  sphincterotomy.  4.  Vascular calcifications including a calcified hepatic artery  aneurysm are unchanged.    I have reviewed and updated the patient's Past Medical History, Social History, Family History and Medication List.    ALLERGIES  Sulfa drugs    ASSESSMENT and PLAN  80 year old male with history of pT2(T1 after MVAC)N0M0R0 urothelial cancer with no evidence of disease s/p Rad Cx 9/15. Doing well. Parastomal hernia managed well. Creatinine stable. No evidence of recurrence. Will plan future surveillance with Dr. Pires and follow-up with me as needed.     Plan:  Follow-up with me prn  Will continue to follow with Dr. Pires for surveillance    Phone call duration: 11 minutes    Stoney Mann MD  Urology  [facility]

## 2020-10-09 NOTE — PROGRESS NOTES
"Baldo Landon is a 80 year old male who is being evaluated via a billable telephone visit.      The patient has been notified of following:     \"This telephone visit will be conducted via a call between you and your physician/provider. We have found that certain health care needs can be provided without the need for a physical exam.  This service lets us provide the care you need with a short phone conversation.  If a prescription is necessary we can send it directly to your pharmacy.  If lab work is needed we can place an order for that and you can then stop by our lab to have the test done at a later time.    Telephone visits are billed at different rates depending on your insurance coverage. During this emergency period, for some insurers they may be billed the same as an in-person visit.  Please reach out to your insurance provider with any questions.    If during the course of the call the physician/provider feels a telephone visit is not appropriate, you will not be charged for this service.\"    Patient has given verbal consent for Telephone visit?  Yes    What phone number would you like to be contacted at? [phone]    How would you like to obtain your AVS? Lisbeth Bobby CMA on 10/9/2020 at 2:41 PM      "

## 2020-10-19 ENCOUNTER — TELEPHONE (OUTPATIENT)
Dept: ONCOLOGY | Facility: CLINIC | Age: 80
End: 2020-10-19

## 2020-10-19 NOTE — TELEPHONE ENCOUNTER
Baldo called this morning, he would like his lab results from last wk and a copy mailed to him. He can be reached at [phone].  Deb García, [facility]

## 2020-10-19 NOTE — TELEPHONE ENCOUNTER
Pt requesting his latest lab results from 10/7/20.  He would like to go over the results over the phone as well as receive a copy mailed to him.  Pt does not have any follow- up appointments scheduled.  Will reach out to Dr. Pires to see if he is ok with us releasing patient his results.  Will also see if he'd like us to set up follow-up with patient to go over results.  Will await Dr. Pires's response before contacting patient.  Routing to Dr. Pires's RNCC, Arpita, to follow.

## 2020-12-20 ENCOUNTER — HEALTH MAINTENANCE LETTER (OUTPATIENT)
Age: 80
End: 2020-12-20

## 2021-04-24 ENCOUNTER — HEALTH MAINTENANCE LETTER (OUTPATIENT)
Age: 81
End: 2021-04-24

## 2021-10-03 ENCOUNTER — HEALTH MAINTENANCE LETTER (OUTPATIENT)
Age: 81
End: 2021-10-03

## 2022-05-15 ENCOUNTER — HEALTH MAINTENANCE LETTER (OUTPATIENT)
Age: 82
End: 2022-05-15

## 2022-09-11 ENCOUNTER — HEALTH MAINTENANCE LETTER (OUTPATIENT)
Age: 82
End: 2022-09-11

## 2023-06-03 ENCOUNTER — HEALTH MAINTENANCE LETTER (OUTPATIENT)
Age: 83
End: 2023-06-03

## 2023-10-27 NOTE — PROGRESS NOTES
[facility] PHYSICIANS  HEMATOLOGY ONCOLOGY     DIAGNOSES:     1.  Muscle invasive bladder cancer diagnosed 05/11/2015.    2.  History of prostate cancer.      TREATMENT:     1.  TURBT, 05/11/2015.    2.  Cycle 1 neoadjuvant dose dense MVAC, methotrexate, vinblastine, doxorubicin and cyclophosphamide 06/11/2015 with Neulasta support.  Cycle 2 06/24/2015. Cycle 3 7/8/15, Cycle 4 7/22/15.  3.  Cystoprostatectomy - 9/15/15    SUBJECTIVE:    Baldo Landon was reached over telephone for this telemedicine visit due to restrictions posed by COVID 19. He is feeling well.    He had a scan done prior to clinic visit and he is being followed to review findings.     REVIEW OF SYSTEMS:  A complete review of systems was performed and found to be negative other than pertinent positives mentioned in history of present illness.     Past medical, social histories reviewed.    Meds- Reviewed.     PHYSICAL EXAMINATION:   VITAL SIGNS:There were no vitals taken for this visit.  Physical exam not done at this telephone telemedicine visit      DATA:   Recent Labs   Lab Test 10/07/20  1019 10/07/19  0926 04/02/19  1230 09/25/18  1018 03/28/18  1158    139 134 138 139   POTASSIUM 4.9 4.8 4.4 4.3 4.8   CHLORIDE 110* 109 108 110* 110*   CO2 20 24 18* 20 21   ANIONGAP 6 6 8 8 8   BUN 53* 33* 32* 42* 40*   CR 2.18* 1.95* 1.80* 2.11* 1.89*   * 245* 236* 209* 291*   CARSON 9.2 9.3 9.2 8.3* 9.0     Recent Labs   Lab Test 09/28/15  0829 09/27/15  1032 09/26/15  0556 09/25/15  0705 09/24/15  0705 09/23/15  0645 09/22/15  0630   MAG 1.8 2.0 1.7 1.9 1.8 1.7  --    PHOS 3.3  --   --  2.8 2.8 2.6 4.0     Recent Labs   Lab Test 10/07/20  1019 10/07/19  0926 04/02/19  1230 09/25/18  1018 03/28/18  1158   WBC 9.0 7.6 8.2 6.1 6.4   HGB 13.5 14.2 14.8 13.4 12.8*    135* 146* 125* 136*   MCV 97 94 94 95 93   NEUTROPHIL 78.2 72.6 73.9 76.4 70.2     Recent Labs   Lab Test 10/07/20  1019 10/07/19  0926 04/02/19  1230   BILITOTAL 0.8 1.4*  1.3   ALKPHOS 89 107 118   ALT 22 29 26   AST 15 25 20   ALBUMIN 3.4 3.4 3.5     198     No results found for: TSH  No results for input(s): CEA in the last [id number] hours.  Results for orders placed or performed during the hospital encounter of 10/07/20   CT Chest Abdomen Pelvis w/o Contrast    Narrative    CT CHEST, ABDOMEN AND PELVIS WITHOUT CONTRAST 10/7/2020 11:09 AM    CLINICAL HISTORY: Bladder cancer - surveillance after  cystoprostatectomy in October, 2015. Malignant neoplasm of overlapping  sites of bladder (H).    TECHNIQUE: CT scan of the chest, abdomen, and pelvis was performed  without IV contrast. Multiplanar reformats were obtained. Dose  reduction techniques were used.   CONTRAST: None.    COMPARISON: 10/7/2019    FINDINGS:   LUNGS AND PLEURA: Medial pleural-based nodule in the right upper lobe  (series 12, image 149) is unchanged. Other tiny pulmonary nodules  measuring less than 3 mm are also unchanged. No new pulmonary nodule,  pneumothorax or pleural effusion.    MEDIASTINUM/AXILLAE: Ascending thoracic aortic diameter upper normal  at 4.0 cm. There is mild coronary atherosclerosis. No mediastinal,  hilar, or axillary lymphadenopathy.    HEPATOBILIARY: Pneumobilia is present. No focal liver lesions within  the limits of a noncontrast study. 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    PANCREAS: Normal.    SPLEEN: Normal.    ADRENAL GLANDS: Normal.    KIDNEYS/BLADDER: Large right renal cyst is unchanged and requires no  specific follow-up. There are 2 nonobstructing calcifications at the  lower pole of the left kidney, the largest of which measures 2 mm. No  hydronephrosis on either side. There has been cystoprostatectomy with  a urinary diversion to the right lower quadrant.    BOWEL: Normal.    LYMPH NODES: Normal.    VASCULATURE: Unremarkable.    PELVIC ORGANS: Cystoprostatectomy. Radiation seeds noted in the  prostate bed.    OTHER: Aortic and iliac artery calcifications again noted,  unchanged.    MUSCULOSKELETAL: Unremarkable.      Impression    IMPRESSION:  1.  No evidence of recurrent or metastatic disease in the chest,  abdomen, or pelvis.  2.  Small nonobstructing calcifications in the lower pole of the left  kidney have increased in size compared to prior.  3.  Pneumobilia is a chronic finding and likely related to prior  sphincterotomy.  4.  Vascular calcifications including a calcified hepatic artery  aneurysm are unchanged.    OSMAN ROCHA MD         ASSESSMENT:    1. Muscle invasive bladder cancer - post dose dense MVAC [id number] disease  2. CKD stage III;   3. Prostate cancer s/p xrt, TIA - 2010, A fib on warfarin, DM II    I have reviewed actual images from his recent staging CT scan. All his previous lung lesions are stable. Nothing to suggest recurrent disease based on his CT scans, labs or symptoms    His kidney function remains stable though his creatinine is slightly numerically higher from the last visit. He does have HTN and DM TII in addition to his CKD. He follows with nephrologist at the [facility]. I encouraged him to keep up with those visits as he does have stage IV chronic kidney disease now.     He is 5 years out from treatment completion. Failures this far out are extremely uncommon. It is more likely to have a different primary tumor. I do not feel that he would benefit from scheduled surveillance scans.     I will not schedule any more follow up visits at this time.       Phone call duration: 13 minutes    Bradford Pires    Hematologist and Medical Oncologist   [facility]         show